# Patient Record
Sex: MALE | Race: OTHER
[De-identification: names, ages, dates, MRNs, and addresses within clinical notes are randomized per-mention and may not be internally consistent; named-entity substitution may affect disease eponyms.]

---

## 2021-06-18 ENCOUNTER — HOSPITAL ENCOUNTER (OUTPATIENT)
Age: 71
End: 2021-06-18
Payer: MEDICARE

## 2021-06-18 VITALS — BODY MASS INDEX: 25.7 KG/M2

## 2021-06-18 DIAGNOSIS — F03.90: Primary | ICD-10-CM

## 2021-06-18 LAB
CREATININE FINGERSTICK: 0.9 MG/DL (ref 0.7–1.3)
EGFR FINGERSTICK: > 60 ML/MIN (ref 60–?)

## 2021-06-18 PROCEDURE — A9575 INJ GADOTERATE MEGLUMI 0.1ML: HCPCS

## 2021-06-18 PROCEDURE — 70553 MRI BRAIN STEM W/O & W/DYE: CPT

## 2023-04-14 LAB
ANION GAP IN SER/PLAS: 12 MMOL/L (ref 10–20)
CALCIUM (MG/DL) IN SER/PLAS: 10 MG/DL (ref 8.6–10.3)
CARBON DIOXIDE, TOTAL (MMOL/L) IN SER/PLAS: 28 MMOL/L (ref 21–32)
CHLORIDE (MMOL/L) IN SER/PLAS: 105 MMOL/L (ref 98–107)
CREATININE (MG/DL) IN SER/PLAS: 1.57 MG/DL (ref 0.5–1.3)
GFR MALE: 46 ML/MIN/1.73M2
GLUCOSE (MG/DL) IN SER/PLAS: 93 MG/DL (ref 74–99)
POTASSIUM (MMOL/L) IN SER/PLAS: 4.1 MMOL/L (ref 3.5–5.3)
SODIUM (MMOL/L) IN SER/PLAS: 141 MMOL/L (ref 136–145)
UREA NITROGEN (MG/DL) IN SER/PLAS: 25 MG/DL (ref 6–23)

## 2023-04-24 ENCOUNTER — TELEPHONE (OUTPATIENT)
Dept: PRIMARY CARE | Facility: CLINIC | Age: 73
End: 2023-04-24
Payer: MEDICARE

## 2023-04-24 DIAGNOSIS — E11.9 TYPE 2 DIABETES MELLITUS WITHOUT COMPLICATION, WITHOUT LONG-TERM CURRENT USE OF INSULIN (MULTI): ICD-10-CM

## 2023-04-24 RX ORDER — PRAVASTATIN SODIUM 20 MG/1
1 TABLET ORAL DAILY
COMMUNITY
End: 2023-04-24 | Stop reason: SDUPTHER

## 2023-04-25 RX ORDER — PRAVASTATIN SODIUM 20 MG/1
20 TABLET ORAL DAILY
Qty: 90 TABLET | Refills: 3 | Status: SHIPPED | OUTPATIENT
Start: 2023-04-25 | End: 2024-06-07 | Stop reason: SDUPTHER

## 2023-05-01 LAB
ANION GAP IN SER/PLAS: 11 MMOL/L (ref 10–20)
CALCIUM (MG/DL) IN SER/PLAS: 9.6 MG/DL (ref 8.6–10.3)
CARBON DIOXIDE, TOTAL (MMOL/L) IN SER/PLAS: 28 MMOL/L (ref 21–32)
CHLORIDE (MMOL/L) IN SER/PLAS: 105 MMOL/L (ref 98–107)
CREATININE (MG/DL) IN SER/PLAS: 1.61 MG/DL (ref 0.5–1.3)
GFR MALE: 45 ML/MIN/1.73M2
GLUCOSE (MG/DL) IN SER/PLAS: 268 MG/DL (ref 74–99)
POTASSIUM (MMOL/L) IN SER/PLAS: 4.3 MMOL/L (ref 3.5–5.3)
SODIUM (MMOL/L) IN SER/PLAS: 140 MMOL/L (ref 136–145)
UREA NITROGEN (MG/DL) IN SER/PLAS: 33 MG/DL (ref 6–23)

## 2023-05-24 LAB
ALANINE AMINOTRANSFERASE (SGPT) (U/L) IN SER/PLAS: 25 U/L (ref 10–52)
ALBUMIN (G/DL) IN SER/PLAS: 4.6 G/DL (ref 3.4–5)
ALKALINE PHOSPHATASE (U/L) IN SER/PLAS: 60 U/L (ref 33–136)
ANION GAP IN SER/PLAS: 13 MMOL/L (ref 10–20)
ASPARTATE AMINOTRANSFERASE (SGOT) (U/L) IN SER/PLAS: 23 U/L (ref 9–39)
BASOPHILS (10*3/UL) IN BLOOD BY AUTOMATED COUNT: 0.03 X10E9/L (ref 0–0.1)
BASOPHILS/100 LEUKOCYTES IN BLOOD BY AUTOMATED COUNT: 0.6 % (ref 0–2)
BILIRUBIN TOTAL (MG/DL) IN SER/PLAS: 1.1 MG/DL (ref 0–1.2)
CALCIUM (MG/DL) IN SER/PLAS: 9.9 MG/DL (ref 8.6–10.3)
CARBON DIOXIDE, TOTAL (MMOL/L) IN SER/PLAS: 28 MMOL/L (ref 21–32)
CHLORIDE (MMOL/L) IN SER/PLAS: 104 MMOL/L (ref 98–107)
CHOLESTEROL (MG/DL) IN SER/PLAS: 162 MG/DL (ref 0–199)
CHOLESTEROL IN HDL (MG/DL) IN SER/PLAS: 56 MG/DL
CHOLESTEROL/HDL RATIO: 2.9
CREATININE (MG/DL) IN SER/PLAS: 1.37 MG/DL (ref 0.5–1.3)
EOSINOPHILS (10*3/UL) IN BLOOD BY AUTOMATED COUNT: 0.15 X10E9/L (ref 0–0.4)
EOSINOPHILS/100 LEUKOCYTES IN BLOOD BY AUTOMATED COUNT: 2.8 % (ref 0–6)
ERYTHROCYTE DISTRIBUTION WIDTH (RATIO) BY AUTOMATED COUNT: 12.2 % (ref 11.5–14.5)
ERYTHROCYTE MEAN CORPUSCULAR HEMOGLOBIN CONCENTRATION (G/DL) BY AUTOMATED: 32.2 G/DL (ref 32–36)
ERYTHROCYTE MEAN CORPUSCULAR VOLUME (FL) BY AUTOMATED COUNT: 100 FL (ref 80–100)
ERYTHROCYTES (10*6/UL) IN BLOOD BY AUTOMATED COUNT: 3.99 X10E12/L (ref 4.5–5.9)
ESTIMATED AVERAGE GLUCOSE FOR HBA1C: 174 MG/DL
GFR MALE: 54 ML/MIN/1.73M2
GLUCOSE (MG/DL) IN SER/PLAS: 110 MG/DL (ref 74–99)
HEMATOCRIT (%) IN BLOOD BY AUTOMATED COUNT: 39.8 % (ref 41–52)
HEMOGLOBIN (G/DL) IN BLOOD: 12.8 G/DL (ref 13.5–17.5)
HEMOGLOBIN A1C/HEMOGLOBIN TOTAL IN BLOOD: 7.7 %
IMMATURE GRANULOCYTES/100 LEUKOCYTES IN BLOOD BY AUTOMATED COUNT: 0.4 % (ref 0–0.9)
LDL: 88 MG/DL (ref 0–99)
LEUKOCYTES (10*3/UL) IN BLOOD BY AUTOMATED COUNT: 5.4 X10E9/L (ref 4.4–11.3)
LYMPHOCYTES (10*3/UL) IN BLOOD BY AUTOMATED COUNT: 1.42 X10E9/L (ref 0.8–3)
LYMPHOCYTES/100 LEUKOCYTES IN BLOOD BY AUTOMATED COUNT: 26.3 % (ref 13–44)
MONOCYTES (10*3/UL) IN BLOOD BY AUTOMATED COUNT: 0.44 X10E9/L (ref 0.05–0.8)
MONOCYTES/100 LEUKOCYTES IN BLOOD BY AUTOMATED COUNT: 8.2 % (ref 2–10)
NEUTROPHILS (10*3/UL) IN BLOOD BY AUTOMATED COUNT: 3.33 X10E9/L (ref 1.6–5.5)
NEUTROPHILS/100 LEUKOCYTES IN BLOOD BY AUTOMATED COUNT: 61.7 % (ref 40–80)
PLATELETS (10*3/UL) IN BLOOD AUTOMATED COUNT: 156 X10E9/L (ref 150–450)
POTASSIUM (MMOL/L) IN SER/PLAS: 4.3 MMOL/L (ref 3.5–5.3)
PROTEIN TOTAL: 6.9 G/DL (ref 6.4–8.2)
SODIUM (MMOL/L) IN SER/PLAS: 141 MMOL/L (ref 136–145)
TRIGLYCERIDE (MG/DL) IN SER/PLAS: 91 MG/DL (ref 0–149)
UREA NITROGEN (MG/DL) IN SER/PLAS: 23 MG/DL (ref 6–23)
VLDL: 18 MG/DL (ref 0–40)

## 2023-06-06 ENCOUNTER — OFFICE VISIT (OUTPATIENT)
Dept: PRIMARY CARE | Facility: CLINIC | Age: 73
End: 2023-06-06
Payer: MEDICARE

## 2023-06-06 VITALS
WEIGHT: 172 LBS | DIASTOLIC BLOOD PRESSURE: 82 MMHG | SYSTOLIC BLOOD PRESSURE: 140 MMHG | HEART RATE: 61 BPM | HEIGHT: 70 IN | OXYGEN SATURATION: 98 % | BODY MASS INDEX: 24.62 KG/M2

## 2023-06-06 DIAGNOSIS — I10 HYPERTENSION, UNSPECIFIED TYPE: ICD-10-CM

## 2023-06-06 DIAGNOSIS — R53.83 FATIGUE, UNSPECIFIED TYPE: ICD-10-CM

## 2023-06-06 DIAGNOSIS — Z86.010 HISTORY OF COLON POLYPS: ICD-10-CM

## 2023-06-06 DIAGNOSIS — D63.1 ANEMIA DUE TO CHRONIC KIDNEY DISEASE, UNSPECIFIED CKD STAGE: ICD-10-CM

## 2023-06-06 DIAGNOSIS — G47.33 OSA (OBSTRUCTIVE SLEEP APNEA): ICD-10-CM

## 2023-06-06 DIAGNOSIS — Z00.00 ROUTINE GENERAL MEDICAL EXAMINATION AT HEALTH CARE FACILITY: Primary | ICD-10-CM

## 2023-06-06 DIAGNOSIS — R41.3 MEMORY DIFFICULTY: ICD-10-CM

## 2023-06-06 DIAGNOSIS — N18.9 ANEMIA DUE TO CHRONIC KIDNEY DISEASE, UNSPECIFIED CKD STAGE: ICD-10-CM

## 2023-06-06 DIAGNOSIS — M25.512 CHRONIC LEFT SHOULDER PAIN: ICD-10-CM

## 2023-06-06 DIAGNOSIS — Z12.5 SCREENING FOR PROSTATE CANCER: ICD-10-CM

## 2023-06-06 DIAGNOSIS — G89.29 CHRONIC LEFT SHOULDER PAIN: ICD-10-CM

## 2023-06-06 DIAGNOSIS — E11.9 TYPE 2 DIABETES MELLITUS WITHOUT COMPLICATION, WITHOUT LONG-TERM CURRENT USE OF INSULIN (MULTI): ICD-10-CM

## 2023-06-06 DIAGNOSIS — N18.30 STAGE 3 CHRONIC KIDNEY DISEASE, UNSPECIFIED WHETHER STAGE 3A OR 3B CKD (MULTI): ICD-10-CM

## 2023-06-06 PROBLEM — Z86.0100 HISTORY OF COLON POLYPS: Status: ACTIVE | Noted: 2023-06-06

## 2023-06-06 PROCEDURE — 3051F HG A1C>EQUAL 7.0%<8.0%: CPT

## 2023-06-06 PROCEDURE — G0439 PPPS, SUBSEQ VISIT: HCPCS

## 2023-06-06 PROCEDURE — 3079F DIAST BP 80-89 MM HG: CPT

## 2023-06-06 PROCEDURE — 1036F TOBACCO NON-USER: CPT

## 2023-06-06 PROCEDURE — 3077F SYST BP >= 140 MM HG: CPT

## 2023-06-06 PROCEDURE — 1159F MED LIST DOCD IN RCRD: CPT

## 2023-06-06 PROCEDURE — 1170F FXNL STATUS ASSESSED: CPT

## 2023-06-06 PROCEDURE — 99214 OFFICE O/P EST MOD 30 MIN: CPT

## 2023-06-06 RX ORDER — LANOLIN ALCOHOL/MO/W.PET/CERES
1 CREAM (GRAM) TOPICAL DAILY
COMMUNITY
Start: 2020-07-01 | End: 2023-12-07 | Stop reason: SDUPTHER

## 2023-06-06 RX ORDER — DAPAGLIFLOZIN 10 MG/1
1 TABLET, FILM COATED ORAL
COMMUNITY
Start: 2021-10-14 | End: 2024-01-22 | Stop reason: SDUPTHER

## 2023-06-06 RX ORDER — METFORMIN HYDROCHLORIDE 1000 MG/1
1000 TABLET ORAL
Qty: 180 TABLET | Refills: 3 | Status: SHIPPED | OUTPATIENT
Start: 2023-06-06 | End: 2024-06-07 | Stop reason: SDUPTHER

## 2023-06-06 RX ORDER — METFORMIN HYDROCHLORIDE 1000 MG/1
1000 TABLET ORAL
COMMUNITY
Start: 2015-04-12 | End: 2023-06-06 | Stop reason: SDUPTHER

## 2023-06-06 RX ORDER — SITAGLIPTIN 100 MG/1
TABLET, FILM COATED ORAL
COMMUNITY
Start: 2015-04-12 | End: 2023-12-07 | Stop reason: SDUPTHER

## 2023-06-06 RX ORDER — ACETAMINOPHEN 500 MG
TABLET ORAL
COMMUNITY

## 2023-06-06 RX ORDER — MEMANTINE HYDROCHLORIDE 10 MG/1
TABLET ORAL
COMMUNITY
Start: 2023-01-29

## 2023-06-06 RX ORDER — DONEPEZIL HYDROCHLORIDE 10 MG/1
TABLET, FILM COATED ORAL
COMMUNITY
Start: 2021-08-10

## 2023-06-06 ASSESSMENT — ENCOUNTER SYMPTOMS
RESPIRATORY NEGATIVE: 1
CARDIOVASCULAR NEGATIVE: 1
CONSTITUTIONAL NEGATIVE: 1
GASTROINTESTINAL NEGATIVE: 1

## 2023-06-06 ASSESSMENT — PATIENT HEALTH QUESTIONNAIRE - PHQ9
2. FEELING DOWN, DEPRESSED OR HOPELESS: NOT AT ALL
SUM OF ALL RESPONSES TO PHQ9 QUESTIONS 1 AND 2: 0
1. LITTLE INTEREST OR PLEASURE IN DOING THINGS: NOT AT ALL

## 2023-06-06 ASSESSMENT — ACTIVITIES OF DAILY LIVING (ADL)
GROCERY_SHOPPING: INDEPENDENT
BATHING: INDEPENDENT
DRESSING: INDEPENDENT
TAKING_MEDICATION: INDEPENDENT
DOING_HOUSEWORK: INDEPENDENT
MANAGING_FINANCES: INDEPENDENT

## 2023-06-06 NOTE — PROGRESS NOTES
"Subjective   Patient ID: Pato Christiansen is a 73 y.o. male who presents for Medicare Annual Wellness Visit Subsequent (5 month follow up/Pt would also like to discuss BW.).    HPI   HYPERTENSION: BP a little high in office today 140/82. Has stopped taking losartan 25mg, endorses BP <130/80 at home. Denies CP/SOB/visual changes/dizziness.  DIABETES MELLITUS TYPE 2: Home BS's <140. Last A1C 7.7 5/24/23. Compliant with medication, no SE.  CKD: Still seeing Dr. Laird. GFR stable.  GERD: Stopped famotidine, has had no issues.  ANEMIA/FATIGUE/THROMBOCYTOPENIA: Was seeing Dr. Smith, but now Dr. Laird watches this. Hgb stable.  HISTORY OF COLON POLYPS: Colonoscopy 1/13/2020. No polyps. Was told no further colonoscopies needed.  SLEEP APNEA: Still using his CPap. Doing well with it. Nightly.   DIASTASIS RECTI: Unchanged.  URINARY FREQUENCY: Unchanged. 8 times daily, once nightly. This most likely d/t Farxiga. Would not like urology referral.  MEMORY DIFFICULTY: Saw Dr. Kate February 2022. Had MRI brain in [place] 6/18/21 which was unremarkable. Has been taking Aricept, no SE. Pretty much back to baseline.  LEFT SHOULDER PAIN: L shoulder surgery 1 year ago. Had PT, follows with Dr. Blount and will get occasional injection.  Leg cramps at night, not every night. Electrolyte drinks have helped some.  CT Cardiac score 207/moderate risk 1/25/22.     UTD flu vaccines  UTD pneumonia vaccines  Got COVID Vaccines and booster   Shingles vaccinated    Review of Systems   Constitutional: Negative.    Respiratory: Negative.     Cardiovascular: Negative.    Gastrointestinal: Negative.        Objective   /82   Pulse 61   Ht 1.778 m (5' 10\")   Wt 78 kg (172 lb)   SpO2 98%   BMI 24.68 kg/m²     Physical Exam  Constitutional:       General: He is not in acute distress.     Appearance: Normal appearance. He is not ill-appearing or toxic-appearing.   HENT:      Head: Normocephalic and atraumatic.   Eyes:      Extraocular Movements: " Extraocular movements intact.      Conjunctiva/sclera: Conjunctivae normal.   Cardiovascular:      Rate and Rhythm: Normal rate and regular rhythm.      Heart sounds: Normal heart sounds. No murmur heard.     No gallop.   Pulmonary:      Effort: Pulmonary effort is normal.      Breath sounds: Normal breath sounds. No stridor. No wheezing.   Abdominal:      General: Bowel sounds are normal. There is no distension.      Palpations: Abdomen is soft.      Tenderness: There is no abdominal tenderness. There is no right CVA tenderness, left CVA tenderness, guarding or rebound.   Musculoskeletal:         General: Normal range of motion.      Cervical back: Neck supple.   Skin:     General: Skin is warm and dry.      Findings: No erythema or rash.   Neurological:      General: No focal deficit present.      Mental Status: He is alert and oriented to person, place, and time.   Psychiatric:         Mood and Affect: Mood normal.         Behavior: Behavior normal.         Thought Content: Thought content normal.         Judgment: Judgment normal.         Assessment/Plan        Chronic conditions seem stable.  Advised he must call me if home BP averaging >130/80, he agrees.  We will follow

## 2023-06-06 NOTE — PROGRESS NOTES
Subjective   Reason for Visit: Pato Christiansen is an 73 y.o. male here for a Medicare Wellness visit.     Past Medical, Surgical, and Family History reviewed and updated in chart.    Reviewed all medications by prescribing practitioner or clinical pharmacist (such as prescriptions, OTCs, herbal therapies and supplements) and documented in the medical record.    HPI  HYPERTENSION: BP a little high in office today 140/82. Has stopped taking losartan 25mg, endorses BP <130/80 at home. Denies CP/SOB/visual changes/dizziness.  DIABETES MELLITUS TYPE 2: Home BS's <140. Last A1C 7.7 5/24/23. Compliant with medication, no SE.  CKD: Still seeing Dr. Laird. GFR stable.  GERD: Stopped famotidine, has had no issues.  ANEMIA/FATIGUE/THROMBOCYTOPENIA: Was seeing Dr. Smith, but now Dr. Laird watches this. Hgb stable.  HISTORY OF COLON POLYPS: Colonoscopy 1/13/2020. No polyps. Was told no further colonoscopies needed.  SLEEP APNEA: Still using his CPap. Doing well with it. Nightly.   DIASTASIS RECTI: Unchanged.  URINARY FREQUENCY: Unchanged. 8 times daily, once nightly. This most likely d/t Farxiga. Would not like urology referral.  MEMORY DIFFICULTY: Saw Dr. Kate February 2022. Had MRI brain in [place] 6/18/21 which was unremarkable. Has been taking Aricept, no SE. Pretty much back to baseline.  LEFT SHOULDER PAIN: L shoulder surgery 1 year ago. Had PT, follows with Dr. Blount and will get occasional injection.  Leg cramps at night, not every night. Electrolyte drinks have helped some.  CT Cardiac score 207/moderate risk 1/25/22.     UTD flu vaccines  UTD pneumonia vaccines  Got COVID Vaccines and booster   Shingles vaccinated    Patient Care Team:  Silvio Singh PA-C as PCP - General  Rustam Mackenzie MD as PCP - [employer] PCP     Review of Systems   Constitutional: Negative.    Respiratory: Negative.     Cardiovascular: Negative.    Gastrointestinal: Negative.        Objective   Vitals:  /82   Pulse 61   Ht  "1.778 m (5' 10\")   Wt 78 kg (172 lb)   SpO2 98%   BMI 24.68 kg/m²       Physical Exam  Constitutional:       General: He is not in acute distress.     Appearance: Normal appearance. He is not ill-appearing or toxic-appearing.   HENT:      Head: Normocephalic and atraumatic.   Eyes:      Extraocular Movements: Extraocular movements intact.      Conjunctiva/sclera: Conjunctivae normal.   Cardiovascular:      Rate and Rhythm: Normal rate and regular rhythm.      Heart sounds: Normal heart sounds. No murmur heard.     No gallop.   Pulmonary:      Effort: Pulmonary effort is normal.      Breath sounds: Normal breath sounds. No stridor. No wheezing.   Abdominal:      General: Bowel sounds are normal. There is no distension.      Palpations: Abdomen is soft.      Tenderness: There is no abdominal tenderness. There is no right CVA tenderness, left CVA tenderness, guarding or rebound.   Musculoskeletal:         General: Normal range of motion.      Cervical back: Neck supple.   Skin:     General: Skin is warm and dry.      Findings: No erythema or rash.   Neurological:      General: No focal deficit present.      Mental Status: He is alert and oriented to person, place, and time.   Psychiatric:         Mood and Affect: Mood normal.         Behavior: Behavior normal.         Thought Content: Thought content normal.         Judgment: Judgment normal.         Assessment/Plan   Problem List Items Addressed This Visit          Nervous    SUNNI (obstructive sleep apnea)       Circulatory    Hypertension       Digestive    History of colon polyps       Musculoskeletal    Chronic left shoulder pain       Endocrine/Metabolic    Type 2 diabetes mellitus without complication, without long-term current use of insulin (CMS/[facility])    Relevant Medications    metFORMIN (Glucophage) 1,000 mg tablet    Other Relevant Orders    Comprehensive Metabolic Panel    Hemoglobin A1C       Hematologic    Anemia due to chronic kidney disease - Primary    " Relevant Orders    Vitamin B12    Iron and TIBC    Ferritin    Folate    CBC       Other    Fatigue    Relevant Orders    Vitamin D, Total    Memory difficulty     Other Visit Diagnoses       Screening for prostate cancer        Relevant Orders    Prostate Specific Antigen, Screen    Stage 3 chronic kidney disease, unspecified whether stage 3a or 3b CKD (CMS/HCC)        Relevant Orders    Vitamin D, Total             Chronic conditions seem stable.  Advised he must call office if home BP averaging >130/80 and we will start a medication again, he agrees.  We will follow up in 6 months with labs prior.

## 2023-07-25 LAB
ALANINE AMINOTRANSFERASE (SGPT) (U/L) IN SER/PLAS: 18 U/L (ref 10–52)
ALBUMIN (G/DL) IN SER/PLAS: 4.6 G/DL (ref 3.4–5)
ALKALINE PHOSPHATASE (U/L) IN SER/PLAS: 50 U/L (ref 33–136)
ANION GAP IN SER/PLAS: 12 MMOL/L (ref 10–20)
ASPARTATE AMINOTRANSFERASE (SGOT) (U/L) IN SER/PLAS: 16 U/L (ref 9–39)
BILIRUBIN TOTAL (MG/DL) IN SER/PLAS: 1.5 MG/DL (ref 0–1.2)
CALCIUM (MG/DL) IN SER/PLAS: 9.4 MG/DL (ref 8.6–10.3)
CARBON DIOXIDE, TOTAL (MMOL/L) IN SER/PLAS: 27 MMOL/L (ref 21–32)
CHLORIDE (MMOL/L) IN SER/PLAS: 104 MMOL/L (ref 98–107)
CREATININE (MG/DL) IN SER/PLAS: 1.32 MG/DL (ref 0.5–1.3)
GFR MALE: 56 ML/MIN/1.73M2
GLUCOSE (MG/DL) IN SER/PLAS: 167 MG/DL (ref 74–99)
POTASSIUM (MMOL/L) IN SER/PLAS: 3.9 MMOL/L (ref 3.5–5.3)
PROTEIN TOTAL: 6.7 G/DL (ref 6.4–8.2)
SODIUM (MMOL/L) IN SER/PLAS: 139 MMOL/L (ref 136–145)
THYROTROPIN (MIU/L) IN SER/PLAS BY DETECTION LIMIT <= 0.05 MIU/L: 1.4 MIU/L (ref 0.44–3.98)
UREA NITROGEN (MG/DL) IN SER/PLAS: 25 MG/DL (ref 6–23)

## 2023-08-31 ENCOUNTER — TELEPHONE (OUTPATIENT)
Dept: PRIMARY CARE | Facility: CLINIC | Age: 73
End: 2023-08-31
Payer: MEDICARE

## 2023-08-31 NOTE — TELEPHONE ENCOUNTER
Wife stating she got Covid when she was in the hospital last week she was prescribed paxlovid. She is concerned the patient might get it. She bought a home test and is going to keep an eye on him. Asking if a Rx for paxlovid can be sent to the pharmacy in case he would need it, since it's a holiday weekend and is afraid she might have trouble contacting a provider. Asking for a call back.

## 2023-09-01 ENCOUNTER — TELEMEDICINE (OUTPATIENT)
Dept: PRIMARY CARE | Facility: CLINIC | Age: 73
End: 2023-09-01
Payer: MEDICARE

## 2023-09-01 DIAGNOSIS — U07.1 COVID-19 VIRUS INFECTION: Primary | ICD-10-CM

## 2023-09-01 PROCEDURE — 99441 PR PHYS/QHP TELEPHONE EVALUATION 5-10 MIN: CPT | Performed by: FAMILY MEDICINE

## 2023-09-01 NOTE — PROGRESS NOTES
Subjective   Patient ID: Pato Christiansen is a 73 y.o. male who presents for COVID+ (Pt. Covid+ pt. C/o congestion and cough x 1 day).    HPI     Telephone VV  Wife helps with  the history     Risk Factors   Over 65   DM  132 this am   Dementia   Egfr 58   Wife had covid last Monday   Pt is positive today  Congestion and cough   First day of symptoms yesterday     No fevers no ha no body aches   No sob     Has covid shots     Has never had covid before       Review of Systems    Objective   There were no vitals taken for this visit.    Physical Exam  VV  Assessment/Plan   Diagnoses and all orders for this visit:  COVID-19 virus infection  -     nirmatrelvir-ritonavir (PAXLOVID) 150-100 mg tablet therapy pack; Take 2 tablets by mouth 2 times a day for 5 days. Follow the instructions on the package    Hold aricept and pravachol

## 2023-09-08 ENCOUNTER — TELEPHONE (OUTPATIENT)
Dept: PRIMARY CARE | Facility: CLINIC | Age: 73
End: 2023-09-08

## 2023-09-08 DIAGNOSIS — U07.1 COVID: Primary | ICD-10-CM

## 2023-09-08 RX ORDER — ALBUTEROL SULFATE 90 UG/1
2 AEROSOL, METERED RESPIRATORY (INHALATION) EVERY 4 HOURS PRN
Qty: 8 G | Refills: 0 | Status: SHIPPED | OUTPATIENT
Start: 2023-09-08 | End: 2023-12-07 | Stop reason: ALTCHOICE

## 2023-09-08 NOTE — TELEPHONE ENCOUNTER
Pt has been sick with covid for 8 days now, his cough is pretty  bad, will not go away. Wants to know if job can order an inhaler for him, sent to [facility], please and thank you

## 2023-09-11 ENCOUNTER — OFFICE VISIT (OUTPATIENT)
Dept: PRIMARY CARE | Facility: CLINIC | Age: 73
End: 2023-09-11
Payer: MEDICARE

## 2023-09-11 VITALS
WEIGHT: 168.2 LBS | BODY MASS INDEX: 24.08 KG/M2 | HEIGHT: 70 IN | DIASTOLIC BLOOD PRESSURE: 64 MMHG | HEART RATE: 97 BPM | OXYGEN SATURATION: 99 % | TEMPERATURE: 97.1 F | SYSTOLIC BLOOD PRESSURE: 118 MMHG

## 2023-09-11 DIAGNOSIS — U09.9 POST-COVID-19 CONDITION: ICD-10-CM

## 2023-09-11 DIAGNOSIS — R05.1 ACUTE COUGH: Primary | ICD-10-CM

## 2023-09-11 PROBLEM — H26.493: Status: ACTIVE | Noted: 2022-03-22

## 2023-09-11 PROBLEM — Z96.1 PSEUDOPHAKIA OF BOTH EYES: Status: ACTIVE | Noted: 2017-10-11

## 2023-09-11 PROBLEM — E83.52 SERUM CALCIUM ELEVATED: Status: ACTIVE | Noted: 2023-09-11

## 2023-09-11 PROBLEM — N18.9 CHRONIC KIDNEY DISEASE: Status: ACTIVE | Noted: 2023-09-11

## 2023-09-11 PROBLEM — D64.9 ANEMIA: Status: ACTIVE | Noted: 2023-09-11

## 2023-09-11 PROBLEM — H43.392 VITREOUS FLOATERS OF LEFT EYE: Status: ACTIVE | Noted: 2017-08-18

## 2023-09-11 PROBLEM — J20.9 ACUTE BRONCHITIS: Status: ACTIVE | Noted: 2023-09-11

## 2023-09-11 PROBLEM — H52.229 REGULAR ASTIGMATISM: Status: ACTIVE | Noted: 2017-04-26

## 2023-09-11 PROBLEM — R00.1 BRADYCARDIA: Status: ACTIVE | Noted: 2023-09-11

## 2023-09-11 PROBLEM — H43.812 POSTERIOR VITREOUS DETACHMENT OF LEFT EYE: Status: ACTIVE | Noted: 2017-08-18

## 2023-09-11 PROBLEM — R05.9 COUGH: Status: ACTIVE | Noted: 2023-09-11

## 2023-09-11 PROBLEM — M24.812 INTERNAL DERANGEMENT OF LEFT SHOULDER: Status: ACTIVE | Noted: 2023-09-11

## 2023-09-11 PROBLEM — Z96.1 S/P CATARACT EXTRACTION AND INSERTION OF INTRAOCULAR LENS: Status: ACTIVE | Noted: 2017-05-19

## 2023-09-11 PROBLEM — R35.0 URINARY FREQUENCY: Status: ACTIVE | Noted: 2023-09-11

## 2023-09-11 PROBLEM — R89.9 ABNORMAL LABORATORY TEST: Status: ACTIVE | Noted: 2023-09-11

## 2023-09-11 PROBLEM — Z98.890 HISTORY OF LASER ASSISTED IN SITU KERATOMILEUSIS: Status: ACTIVE | Noted: 2017-04-26

## 2023-09-11 PROBLEM — M62.838 MUSCLE SPASM: Status: ACTIVE | Noted: 2023-09-11

## 2023-09-11 PROBLEM — K21.9 GERD (GASTROESOPHAGEAL REFLUX DISEASE): Status: ACTIVE | Noted: 2023-09-11

## 2023-09-11 PROBLEM — H35.54 RPE (RETINAL PIGMENT EPITHELIUM) ATROPHY: Status: ACTIVE | Noted: 2021-09-22

## 2023-09-11 PROBLEM — Z98.49 S/P CATARACT EXTRACTION AND INSERTION OF INTRAOCULAR LENS: Status: ACTIVE | Noted: 2017-05-19

## 2023-09-11 PROBLEM — M54.2 NECK PAIN: Status: ACTIVE | Noted: 2023-09-11

## 2023-09-11 PROBLEM — H52.13 HIGH MYOPIA, BILATERAL: Status: ACTIVE | Noted: 2021-09-22

## 2023-09-11 PROCEDURE — 3078F DIAST BP <80 MM HG: CPT | Performed by: NURSE PRACTITIONER

## 2023-09-11 PROCEDURE — 1159F MED LIST DOCD IN RCRD: CPT | Performed by: NURSE PRACTITIONER

## 2023-09-11 PROCEDURE — 1036F TOBACCO NON-USER: CPT | Performed by: NURSE PRACTITIONER

## 2023-09-11 PROCEDURE — 3074F SYST BP LT 130 MM HG: CPT | Performed by: NURSE PRACTITIONER

## 2023-09-11 PROCEDURE — 99213 OFFICE O/P EST LOW 20 MIN: CPT | Performed by: NURSE PRACTITIONER

## 2023-09-11 PROCEDURE — 3051F HG A1C>EQUAL 7.0%<8.0%: CPT | Performed by: NURSE PRACTITIONER

## 2023-09-11 RX ORDER — BENZONATATE 200 MG/1
200 CAPSULE ORAL 3 TIMES DAILY PRN
Qty: 42 CAPSULE | Refills: 0 | Status: SHIPPED | OUTPATIENT
Start: 2023-09-11 | End: 2023-10-11

## 2023-09-11 RX ORDER — LORATADINE 10 MG/1
10 TABLET ORAL DAILY
COMMUNITY
End: 2024-04-15 | Stop reason: ALTCHOICE

## 2023-09-11 ASSESSMENT — ENCOUNTER SYMPTOMS
HEADACHES: 0
APPETITE CHANGE: 0
MYALGIAS: 0
NAUSEA: 0
DIARRHEA: 0
WHEEZING: 0
ARTHRALGIAS: 0
DIZZINESS: 0
COUGH: 1
FEVER: 0
SHORTNESS OF BREATH: 0
VOMITING: 0
ACTIVITY CHANGE: 0
LIGHT-HEADEDNESS: 0
PALPITATIONS: 0

## 2023-09-11 NOTE — PROGRESS NOTES
"Subjective   Patient ID: Pato Christiansen is a 73 y.o. male who presents for Cough (Worse the last 3 days).    Had COVID, completed paxlovid around 09/06/23; symptoms improved except cough  Continues to have a cough barky cough throughout the day   Tried OTC cold medication, low dose Tessalon Perles, albuterol inhaler.  \"Nothing seems to help\"  Denies SOC, CP, HA, wheezing         Review of Systems   Constitutional:  Negative for activity change, appetite change and fever.   Respiratory:  Positive for cough. Negative for shortness of breath and wheezing.    Cardiovascular:  Negative for chest pain, palpitations and leg swelling.   Gastrointestinal:  Negative for diarrhea, nausea and vomiting.   Musculoskeletal:  Negative for arthralgias and myalgias.   Neurological:  Negative for dizziness, light-headedness and headaches.       Objective   /64 (Patient Position: Sitting)   Pulse 97   Temp 36.2 °C (97.1 °F)   Ht 1.778 m (5' 10\")   Wt 76.3 kg (168 lb 3.2 oz)   SpO2 99%   BMI 24.13 kg/m²     Physical Exam  Vitals reviewed.   Constitutional:       General: He is not in acute distress.     Appearance: Normal appearance. He is normal weight. He is not ill-appearing.   Cardiovascular:      Rate and Rhythm: Normal rate and regular rhythm.      Pulses: Normal pulses.   Pulmonary:      Effort: Pulmonary effort is normal.      Breath sounds: Normal breath sounds. No wheezing.   Skin:     General: Skin is warm and dry.   Neurological:      Mental Status: He is alert and oriented to person, place, and time.         Assessment/Plan   Diagnoses and all orders for this visit:  Acute cough  -     benzonatate (Tessalon) 200 mg capsule; Take 1 capsule (200 mg) by mouth 3 times a day as needed for cough. Do not crush or chew.  - Recommend switching from Claritin to allegra, may take OTC Mucinex as needed for cough/congestion  - Continue to use albuterol inhaler as needed  Post-COVID-19 condition  - frequent cough; treatment as " above

## 2023-09-18 PROBLEM — E11.21: Status: ACTIVE | Noted: 2023-09-18

## 2023-09-18 RX ORDER — TIZANIDINE 2 MG/1
1 TABLET ORAL NIGHTLY
COMMUNITY
End: 2023-12-07 | Stop reason: ALTCHOICE

## 2023-09-18 RX ORDER — BLOOD SUGAR DIAGNOSTIC
STRIP MISCELLANEOUS DAILY
COMMUNITY
End: 2024-04-17 | Stop reason: WASHOUT

## 2023-09-18 RX ORDER — TROLAMINE SALICYLATE 10 G/100G
CREAM TOPICAL
COMMUNITY
End: 2023-12-07 | Stop reason: WASHOUT

## 2023-10-10 DIAGNOSIS — N18.9 CHRONIC KIDNEY DISEASE, UNSPECIFIED CKD STAGE: Primary | ICD-10-CM

## 2023-10-11 ENCOUNTER — LAB (OUTPATIENT)
Dept: LAB | Facility: LAB | Age: 73
End: 2023-10-11
Payer: MEDICARE

## 2023-10-11 DIAGNOSIS — N18.9 CHRONIC KIDNEY DISEASE, UNSPECIFIED CKD STAGE: ICD-10-CM

## 2023-10-11 LAB
ANION GAP SERPL CALC-SCNC: 12 MMOL/L (ref 10–20)
BUN SERPL-MCNC: 19 MG/DL (ref 6–23)
CALCIUM SERPL-MCNC: 9.7 MG/DL (ref 8.6–10.3)
CHLORIDE SERPL-SCNC: 106 MMOL/L (ref 98–107)
CO2 SERPL-SCNC: 29 MMOL/L (ref 21–32)
CREAT SERPL-MCNC: 1.31 MG/DL (ref 0.5–1.3)
GFR SERPL CREATININE-BSD FRML MDRD: 57 ML/MIN/1.73M*2
GLUCOSE SERPL-MCNC: 180 MG/DL (ref 74–99)
POTASSIUM SERPL-SCNC: 4.7 MMOL/L (ref 3.5–5.3)
SODIUM SERPL-SCNC: 142 MMOL/L (ref 136–145)

## 2023-10-11 PROCEDURE — 36415 COLL VENOUS BLD VENIPUNCTURE: CPT

## 2023-10-11 PROCEDURE — 80048 BASIC METABOLIC PNL TOTAL CA: CPT

## 2023-10-18 ENCOUNTER — TELEPHONE (OUTPATIENT)
Dept: NEPHROLOGY | Facility: CLINIC | Age: 73
End: 2023-10-18

## 2023-10-18 ENCOUNTER — DOCUMENTATION (OUTPATIENT)
Dept: NEPHROLOGY | Facility: CLINIC | Age: 73
End: 2023-10-18

## 2023-10-18 ENCOUNTER — OFFICE VISIT (OUTPATIENT)
Dept: NEPHROLOGY | Facility: CLINIC | Age: 73
End: 2023-10-18
Payer: MEDICARE

## 2023-10-18 VITALS
BODY MASS INDEX: 25.11 KG/M2 | HEART RATE: 77 BPM | SYSTOLIC BLOOD PRESSURE: 128 MMHG | DIASTOLIC BLOOD PRESSURE: 76 MMHG | WEIGHT: 175 LBS

## 2023-10-18 DIAGNOSIS — N18.31 STAGE 3A CHRONIC KIDNEY DISEASE (MULTI): Primary | ICD-10-CM

## 2023-10-18 DIAGNOSIS — I10 PRIMARY HYPERTENSION: ICD-10-CM

## 2023-10-18 DIAGNOSIS — E11.21 CONTROLLED DIABETES MELLITUS WITH NEPHROPATHY (MULTI): ICD-10-CM

## 2023-10-18 PROCEDURE — 1036F TOBACCO NON-USER: CPT | Performed by: INTERNAL MEDICINE

## 2023-10-18 PROCEDURE — 1159F MED LIST DOCD IN RCRD: CPT | Performed by: INTERNAL MEDICINE

## 2023-10-18 PROCEDURE — 3078F DIAST BP <80 MM HG: CPT | Performed by: INTERNAL MEDICINE

## 2023-10-18 PROCEDURE — 3066F NEPHROPATHY DOC TX: CPT | Performed by: INTERNAL MEDICINE

## 2023-10-18 PROCEDURE — 1160F RVW MEDS BY RX/DR IN RCRD: CPT | Performed by: INTERNAL MEDICINE

## 2023-10-18 PROCEDURE — 3074F SYST BP LT 130 MM HG: CPT | Performed by: INTERNAL MEDICINE

## 2023-10-18 PROCEDURE — 99213 OFFICE O/P EST LOW 20 MIN: CPT | Performed by: INTERNAL MEDICINE

## 2023-10-18 PROCEDURE — 3051F HG A1C>EQUAL 7.0%<8.0%: CPT | Performed by: INTERNAL MEDICINE

## 2023-10-18 RX ORDER — LOSARTAN POTASSIUM 25 MG/1
25 TABLET ORAL DAILY
COMMUNITY
End: 2024-05-13 | Stop reason: SDUPTHER

## 2023-10-18 NOTE — PROGRESS NOTES
Subjective     Has had a rough fall  Had COVID andhas been very weak.    Patient ID: Pato Christiansen is a 73 y.o. male who presents for 6 month follow up.  HPI  He is here for follow-up secondary to chronic kidney disease stage III with a baseline creatinine of 1.2-1.5 with underlying diabetes and hypertension also has obstructive sleep apnea and a left renal cyst  Labs were recently completed on October 11  BUN is 19 with a creatinine of 1.31 estimated GFR is 57 electrolytes look very good glucose elevated at 180  Her renal function is quite stable  His medications are reviewed and they include aspirin vitamin D Farxiga Januvia metformin statin  His blood pressure today is 128/76  His last hemoglobin A1c was 7.7  Review of Systems   All other systems reviewed and are negative.      Objective   Physical Exam  Constitutional:       Appearance: Normal appearance.   HENT:      Head: Normocephalic and atraumatic.      Right Ear: External ear normal.      Left Ear: External ear normal.      Nose: Nose normal.      Mouth/Throat:      Mouth: Mucous membranes are moist.      Pharynx: Oropharynx is clear.   Eyes:      Extraocular Movements: Extraocular movements intact.      Conjunctiva/sclera: Conjunctivae normal.      Pupils: Pupils are equal, round, and reactive to light.   Cardiovascular:      Rate and Rhythm: Normal rate and regular rhythm.   Pulmonary:      Effort: Pulmonary effort is normal.      Breath sounds: Normal breath sounds.   Abdominal:      General: Abdomen is flat.      Palpations: Abdomen is soft.   Skin:     General: Skin is warm and dry.   Neurological:      General: No focal deficit present.      Mental Status: He is alert and oriented to person, place, and time.      Comments: Left Tremor   Psychiatric:         Mood and Affect: Mood normal.         Behavior: Behavior normal.         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

## 2023-11-30 ENCOUNTER — HOSPITAL ENCOUNTER (OUTPATIENT)
Age: 73
Discharge: HOME | End: 2023-11-30
Payer: MEDICARE

## 2023-11-30 DIAGNOSIS — F03.90: Primary | ICD-10-CM

## 2023-11-30 DIAGNOSIS — G25.0: ICD-10-CM

## 2023-11-30 LAB
ALANINE AMINOTRANSFER ALT/SGPT: 24 U/L (ref 16–61)
ALBUMIN SERPL-MCNC: 4.1 G/DL (ref 3.2–5)
ALKALINE PHOSPHATASE: 59 U/L (ref 45–117)
ANION GAP: 7 (ref 5–15)
AST(SGOT): 22 U/L (ref 15–37)
BUN SERPL-MCNC: 20 MG/DL (ref 7–18)
BUN/CREAT RATIO: 14.1 RATIO (ref 10–20)
CALCIUM SERPL-MCNC: 9.4 MG/DL (ref 8.5–10.1)
CARBON DIOXIDE: 28 MMOL/L (ref 21–32)
CHLORIDE: 107 MMOL/L (ref 98–107)
EST GLOM FILT RATE - AFR AMER: 63 ML/MIN (ref 60–?)
GLOBULIN: 3 G/DL (ref 2.2–4.2)
GLUCOSE: 107 MG/DL (ref 74–106)
POTASSIUM: 4.5 MMOL/L (ref 3.5–5.1)

## 2023-11-30 PROCEDURE — 36415 COLL VENOUS BLD VENIPUNCTURE: CPT

## 2023-11-30 PROCEDURE — 84443 ASSAY THYROID STIM HORMONE: CPT

## 2023-11-30 PROCEDURE — 80053 COMPREHEN METABOLIC PANEL: CPT

## 2023-12-01 ENCOUNTER — LAB (OUTPATIENT)
Dept: LAB | Facility: LAB | Age: 73
End: 2023-12-01
Payer: MEDICARE

## 2023-12-01 DIAGNOSIS — R53.83 FATIGUE, UNSPECIFIED TYPE: ICD-10-CM

## 2023-12-01 DIAGNOSIS — N18.30 STAGE 3 CHRONIC KIDNEY DISEASE, UNSPECIFIED WHETHER STAGE 3A OR 3B CKD (MULTI): ICD-10-CM

## 2023-12-01 DIAGNOSIS — E11.9 TYPE 2 DIABETES MELLITUS WITHOUT COMPLICATION, WITHOUT LONG-TERM CURRENT USE OF INSULIN (MULTI): ICD-10-CM

## 2023-12-01 DIAGNOSIS — N18.9 ANEMIA DUE TO CHRONIC KIDNEY DISEASE, UNSPECIFIED CKD STAGE: ICD-10-CM

## 2023-12-01 DIAGNOSIS — Z12.5 SCREENING FOR PROSTATE CANCER: ICD-10-CM

## 2023-12-01 DIAGNOSIS — D63.1 ANEMIA DUE TO CHRONIC KIDNEY DISEASE, UNSPECIFIED CKD STAGE: ICD-10-CM

## 2023-12-01 LAB
25(OH)D3 SERPL-MCNC: 102 NG/ML (ref 30–100)
ALBUMIN SERPL BCP-MCNC: 4.5 G/DL (ref 3.4–5)
ALP SERPL-CCNC: 53 U/L (ref 33–136)
ALT SERPL W P-5'-P-CCNC: 16 U/L (ref 10–52)
ANION GAP SERPL CALC-SCNC: 12 MMOL/L (ref 10–20)
AST SERPL W P-5'-P-CCNC: 22 U/L (ref 9–39)
BILIRUB SERPL-MCNC: 1.3 MG/DL (ref 0–1.2)
BUN SERPL-MCNC: 21 MG/DL (ref 6–23)
CALCIUM SERPL-MCNC: 9.8 MG/DL (ref 8.6–10.3)
CHLORIDE SERPL-SCNC: 105 MMOL/L (ref 98–107)
CO2 SERPL-SCNC: 29 MMOL/L (ref 21–32)
CREAT SERPL-MCNC: 1.34 MG/DL (ref 0.5–1.3)
ERYTHROCYTE [DISTWIDTH] IN BLOOD BY AUTOMATED COUNT: 11.9 % (ref 11.5–14.5)
EST. AVERAGE GLUCOSE BLD GHB EST-MCNC: 157 MG/DL
FERRITIN SERPL-MCNC: 77 NG/ML (ref 20–300)
FOLATE SERPL-MCNC: >22.3 NG/ML
GFR SERPL CREATININE-BSD FRML MDRD: 56 ML/MIN/1.73M*2
GLUCOSE SERPL-MCNC: 173 MG/DL (ref 74–99)
HBA1C MFR BLD: 7.1 %
HCT VFR BLD AUTO: 38.7 % (ref 41–52)
HGB BLD-MCNC: 12.9 G/DL (ref 13.5–17.5)
IRON SATN MFR SERPL: 42 % (ref 25–45)
IRON SERPL-MCNC: 122 UG/DL (ref 35–150)
MCH RBC QN AUTO: 33.6 PG (ref 26–34)
MCHC RBC AUTO-ENTMCNC: 33.3 G/DL (ref 32–36)
MCV RBC AUTO: 101 FL (ref 80–100)
NRBC BLD-RTO: 0 /100 WBCS (ref 0–0)
PLATELET # BLD AUTO: 144 X10*3/UL (ref 150–450)
POTASSIUM SERPL-SCNC: 4.3 MMOL/L (ref 3.5–5.3)
PROT SERPL-MCNC: 6.5 G/DL (ref 6.4–8.2)
PSA SERPL-MCNC: 0.67 NG/ML
RBC # BLD AUTO: 3.84 X10*6/UL (ref 4.5–5.9)
SODIUM SERPL-SCNC: 142 MMOL/L (ref 136–145)
TIBC SERPL-MCNC: 291 UG/DL (ref 240–445)
UIBC SERPL-MCNC: 169 UG/DL (ref 110–370)
VIT B12 SERPL-MCNC: 1472 PG/ML (ref 211–911)
WBC # BLD AUTO: 5.4 X10*3/UL (ref 4.4–11.3)

## 2023-12-01 PROCEDURE — 83550 IRON BINDING TEST: CPT

## 2023-12-01 PROCEDURE — 82728 ASSAY OF FERRITIN: CPT

## 2023-12-01 PROCEDURE — 82746 ASSAY OF FOLIC ACID SERUM: CPT

## 2023-12-01 PROCEDURE — 82607 VITAMIN B-12: CPT

## 2023-12-01 PROCEDURE — 83540 ASSAY OF IRON: CPT

## 2023-12-01 PROCEDURE — 85027 COMPLETE CBC AUTOMATED: CPT

## 2023-12-01 PROCEDURE — 36415 COLL VENOUS BLD VENIPUNCTURE: CPT

## 2023-12-01 PROCEDURE — 82306 VITAMIN D 25 HYDROXY: CPT

## 2023-12-01 PROCEDURE — G0103 PSA SCREENING: HCPCS

## 2023-12-01 PROCEDURE — 80053 COMPREHEN METABOLIC PANEL: CPT

## 2023-12-01 PROCEDURE — 83036 HEMOGLOBIN GLYCOSYLATED A1C: CPT

## 2023-12-07 ENCOUNTER — OFFICE VISIT (OUTPATIENT)
Dept: PRIMARY CARE | Facility: CLINIC | Age: 73
End: 2023-12-07
Payer: MEDICARE

## 2023-12-07 VITALS
BODY MASS INDEX: 24.62 KG/M2 | HEIGHT: 70 IN | SYSTOLIC BLOOD PRESSURE: 108 MMHG | HEART RATE: 72 BPM | DIASTOLIC BLOOD PRESSURE: 68 MMHG | WEIGHT: 172 LBS

## 2023-12-07 DIAGNOSIS — G47.33 OSA (OBSTRUCTIVE SLEEP APNEA): ICD-10-CM

## 2023-12-07 DIAGNOSIS — K21.9 GASTROESOPHAGEAL REFLUX DISEASE WITHOUT ESOPHAGITIS: ICD-10-CM

## 2023-12-07 DIAGNOSIS — N18.31 ANEMIA DUE TO STAGE 3A CHRONIC KIDNEY DISEASE (MULTI): ICD-10-CM

## 2023-12-07 DIAGNOSIS — D63.1 ANEMIA DUE TO STAGE 3A CHRONIC KIDNEY DISEASE (MULTI): ICD-10-CM

## 2023-12-07 DIAGNOSIS — I10 PRIMARY HYPERTENSION: ICD-10-CM

## 2023-12-07 DIAGNOSIS — R53.83 FATIGUE, UNSPECIFIED TYPE: ICD-10-CM

## 2023-12-07 DIAGNOSIS — R41.3 MEMORY DIFFICULTY: Primary | ICD-10-CM

## 2023-12-07 DIAGNOSIS — E11.9 TYPE 2 DIABETES MELLITUS WITHOUT COMPLICATION, WITHOUT LONG-TERM CURRENT USE OF INSULIN (MULTI): ICD-10-CM

## 2023-12-07 PROCEDURE — 3051F HG A1C>EQUAL 7.0%<8.0%: CPT

## 2023-12-07 PROCEDURE — 1160F RVW MEDS BY RX/DR IN RCRD: CPT

## 2023-12-07 PROCEDURE — 1159F MED LIST DOCD IN RCRD: CPT

## 2023-12-07 PROCEDURE — 3078F DIAST BP <80 MM HG: CPT

## 2023-12-07 PROCEDURE — 3066F NEPHROPATHY DOC TX: CPT

## 2023-12-07 PROCEDURE — 99214 OFFICE O/P EST MOD 30 MIN: CPT

## 2023-12-07 PROCEDURE — 3074F SYST BP LT 130 MM HG: CPT

## 2023-12-07 PROCEDURE — 1036F TOBACCO NON-USER: CPT

## 2023-12-07 PROCEDURE — 4010F ACE/ARB THERAPY RXD/TAKEN: CPT

## 2023-12-07 RX ORDER — LANOLIN ALCOHOL/MO/W.PET/CERES
1000 CREAM (GRAM) TOPICAL DAILY
Qty: 90 TABLET | Refills: 3 | Status: SHIPPED | OUTPATIENT
Start: 2023-12-07 | End: 2024-12-06

## 2023-12-07 RX ORDER — SITAGLIPTIN 100 MG/1
TABLET, FILM COATED ORAL
Qty: 90 TABLET | Refills: 3 | Status: SHIPPED | OUTPATIENT
Start: 2023-12-07

## 2023-12-07 RX ORDER — PRIMIDONE 50 MG/1
50 TABLET ORAL 2 TIMES DAILY
COMMUNITY
Start: 2023-11-30

## 2023-12-07 ASSESSMENT — ENCOUNTER SYMPTOMS
CARDIOVASCULAR NEGATIVE: 1
GASTROINTESTINAL NEGATIVE: 1
CONSTITUTIONAL NEGATIVE: 1
RESPIRATORY NEGATIVE: 1

## 2023-12-07 NOTE — PROGRESS NOTES
"Subjective   Patient ID: Pato Christiansen is a 73 y.o. male who presents for 6 month med check .    HPI   HYPERTENSION: BP a little high in office today 108/68. Compliant with regimen, endorses BP <130/80 at home. Denies CP/SOB/visual changes/dizziness.  DIABETES MELLITUS TYPE 2: Home BS's 100-140. Last A1C 7.1 12/1/23. Compliant with medication, no SE.  CKD: Still seeing Dr. Laird. GFR stable.  GERD: Stopped famotidine, has had no issues.  ANEMIA/FATIGUE/THROMBOCYTOPENIA: Was seeing Dr. Smith, but now Dr. Laird watches this. Hgb stable.  HISTORY OF COLON POLYPS: Colonoscopy 1/13/2020. No polyps. Was told no further colonoscopies needed.  SLEEP APNEA: Still using his CPap. Doing well with it. More prn now.  DIASTASIS RECTI: Unchanged.  URINARY FREQUENCY: Unchanged. 8 times daily, once nightly. This most likely d/t Farxiga. Would not like urology referral.  MEMORY DIFFICULTY: Sees Dr. Kate February annually. Had MRI brain in [place] 6/18/21 which was unremarkable. Has been taking Aricept, no SE. Pretty much back to baseline.  LEFT SHOULDER PAIN: L shoulder surgery 2022. Had PT, follows with Dr. Blount and will get occasional injection.  CRAMPS: Leg cramps at night, not every night. Electrolyte drinks have helped some.  LOW B12: Last level 1400.   LOW VIT D: Last level 102.   CT Cardiac score 207/moderate risk 1/25/22.     UTD flu vaccines  UTD pneumonia vaccines  Got COVID Vaccines and booster   Shingles vaccinated    Review of Systems   Constitutional: Negative.    Respiratory: Negative.     Cardiovascular: Negative.    Gastrointestinal: Negative.        Objective   /68   Pulse 72   Ht 1.778 m (5' 10\")   Wt 78 kg (172 lb)   BMI 24.68 kg/m²     Physical Exam  Constitutional:       General: He is not in acute distress.     Appearance: Normal appearance. He is not ill-appearing.   HENT:      Head: Normocephalic and atraumatic.   Eyes:      Extraocular Movements: Extraocular movements intact.      " Conjunctiva/sclera: Conjunctivae normal.   Cardiovascular:      Rate and Rhythm: Normal rate.   Pulmonary:      Effort: Pulmonary effort is normal.   Abdominal:      General: There is no distension.   Musculoskeletal:         General: Normal range of motion.      Cervical back: Normal range of motion.   Skin:     General: Skin is warm and dry.   Neurological:      General: No focal deficit present.      Mental Status: He is alert and oriented to person, place, and time.   Psychiatric:         Mood and Affect: Mood normal.         Behavior: Behavior normal.         Thought Content: Thought content normal.         Judgment: Judgment normal.         Assessment/Plan        Chronic conditions stable.  Seeing specialists appropriately.  Advised he skip B12 every other day for about 2 months. No other medication changes today.  Labs stable.  Follow up 6 months with fasting labs prior.

## 2024-01-22 DIAGNOSIS — E11.69 TYPE 2 DIABETES MELLITUS WITH OTHER SPECIFIED COMPLICATION, UNSPECIFIED WHETHER LONG TERM INSULIN USE (MULTI): ICD-10-CM

## 2024-01-22 RX ORDER — DAPAGLIFLOZIN 10 MG/1
10 TABLET, FILM COATED ORAL
Qty: 90 TABLET | Refills: 3 | Status: SHIPPED | OUTPATIENT
Start: 2024-01-22 | End: 2025-01-21

## 2024-02-26 ENCOUNTER — TELEPHONE (OUTPATIENT)
Dept: NEPHROLOGY | Facility: CLINIC | Age: 74
End: 2024-02-26

## 2024-02-26 ENCOUNTER — OFFICE VISIT (OUTPATIENT)
Dept: ORTHOPEDIC SURGERY | Facility: CLINIC | Age: 74
End: 2024-02-26
Payer: MEDICARE

## 2024-02-26 ENCOUNTER — HOSPITAL ENCOUNTER (OUTPATIENT)
Dept: RADIOLOGY | Facility: CLINIC | Age: 74
Discharge: HOME | End: 2024-02-26
Payer: MEDICARE

## 2024-02-26 DIAGNOSIS — R26.89 BALANCE DISORDER: Primary | ICD-10-CM

## 2024-02-26 DIAGNOSIS — R29.898 LEG WEAKNESS, BILATERAL: ICD-10-CM

## 2024-02-26 DIAGNOSIS — M25.512 PAIN IN LEFT SHOULDER: ICD-10-CM

## 2024-02-26 PROCEDURE — 72040 X-RAY EXAM NECK SPINE 2-3 VW: CPT

## 2024-02-26 PROCEDURE — 99214 OFFICE O/P EST MOD 30 MIN: CPT | Performed by: NURSE PRACTITIONER

## 2024-02-26 PROCEDURE — 4010F ACE/ARB THERAPY RXD/TAKEN: CPT | Performed by: NURSE PRACTITIONER

## 2024-02-26 PROCEDURE — 1159F MED LIST DOCD IN RCRD: CPT | Performed by: NURSE PRACTITIONER

## 2024-02-26 PROCEDURE — 1036F TOBACCO NON-USER: CPT | Performed by: NURSE PRACTITIONER

## 2024-02-26 PROCEDURE — 1160F RVW MEDS BY RX/DR IN RCRD: CPT | Performed by: NURSE PRACTITIONER

## 2024-02-26 PROCEDURE — 72040 X-RAY EXAM NECK SPINE 2-3 VW: CPT | Performed by: RADIOLOGY

## 2024-02-26 PROCEDURE — 1125F AMNT PAIN NOTED PAIN PRSNT: CPT | Performed by: NURSE PRACTITIONER

## 2024-02-26 ASSESSMENT — ENCOUNTER SYMPTOMS
RESPIRATORY NEGATIVE: 1
CONSTITUTIONAL NEGATIVE: 1
PSYCHIATRIC NEGATIVE: 1
CARDIOVASCULAR NEGATIVE: 1
ENDOCRINE NEGATIVE: 1
HEMATOLOGIC/LYMPHATIC NEGATIVE: 1
ARTHRALGIAS: 1

## 2024-02-26 ASSESSMENT — PAIN - FUNCTIONAL ASSESSMENT: PAIN_FUNCTIONAL_ASSESSMENT: 0-10

## 2024-02-26 ASSESSMENT — PAIN SCALES - GENERAL: PAINLEVEL_OUTOF10: 4

## 2024-02-26 ASSESSMENT — PAIN DESCRIPTION - DESCRIPTORS: DESCRIPTORS: ACHING;CRAMPING;DULL

## 2024-02-26 NOTE — PROGRESS NOTES
Subjective    Patient ID: Pato Christiansen is a 74 y.o. male.    Chief Complaint: Weakness, Gen of the Left Leg (Pt is accompanied by his wife. They states that he had Covid in 09/2023, His wife also mentioned that she realized the patient was on a medication that can cause weakness upon standing. She had the patient stop the medication and he states he feels better. Patient has fallen twice  in the last two weeks, when he stood up from sitting in a chair.) and Weakness, Gen of the Right Leg     Last Surgery: No surgery found  Last Surgery Date: No surgery found    HPI  Is a pleasant 74-year-old gentleman presenting today for  NPV bilat leg weakness, COVID in Septemebr 2023, weakness since  Had generalized leg pain early after Covid, now resolved  2 falls in 2 weeks, no injuries  Farxiga- wife concerned for adverse reaction, stopped med 2 days ago and overall less weakness and shakiness  Weakness was to upper legs  No pain or weakness to low back  + feels off balance with walking    Review of Systems   Constitutional: Negative.    HENT: Negative.     Respiratory: Negative.     Cardiovascular: Negative.    Endocrine: Negative.    Musculoskeletal:  Positive for arthralgias and gait problem.   Skin: Negative.    Neurological:  Positive for weakness.        Low extremity weakness, balance disturbance   Hematological: Negative.    Psychiatric/Behavioral: Negative.          Objective   Right Knee Exam     Comments:  Baseline range of motion      Left Knee Exam     Comments:  Baseline range of motion      Right Hip Exam     Tests   MARIANNE: negative    Other   Erythema: absent  Sensation: normal  Pulse: present    Comments:  Baseline range of motion, positive quad weakness      Left Hip Exam     Tests   MARIANNE: negative    Other   Erythema: absent  Sensation: normal  Pulse: present    Comments:  Baseline range of motion, positive quad weakness      Back Exam     Tenderness   Back tenderness location: none.    Range of Motion    The patient has normal back ROM.    Other   Sensation: normal  Erythema: no back redness    Comments:  Patient ambulates with purposeful gait, hold onto walls and monitor.  Mild to moderate bilateral lower leg weakness, supposed to do with quad strength noted.  Difficulty with single-leg stand and squat.  Distal neurovascular intact.            Image Results:      Assessment/Plan   Encounter Diagnoses:  Problem List Items Addressed This Visit             ICD-10-CM    Leg weakness, bilateral R29.898     We discussed position change in home safety.  I did recommend cane use for support on as needed basis.  We discussed no orthopedic identified during today's visit.  Patient does demonstrate lower extremity weakness and will initiate PT for strengthening and balance concerns.  Patient and spouse referred to follow-up with Dr. Laird regarding concern for adverse effects of Farxiga, will also provide a copy of today's visit.  Recommended follow-up with PCP regarding concerns for long-haul COVID symptoms exacerbating balance disturbance and lower extremity strength possible neurology referral.,   Plan for follow-up.  On as needed basis for any bone or joint concerns.  Patient and family in agreement with plan of care  This note was generated using Dragon software.  It may contain errors in wording, punctuation or spelling.         Relevant Orders    Follow Up In Orthopaedic Surgery    Referral to Physical Therapy    Balance disorder - Primary R26.89    Relevant Orders    Follow Up In Orthopaedic Surgery    Referral to Physical Therapy

## 2024-02-26 NOTE — LETTER
March 1, 2024     Silvio Singh PA-C  [address]  [facility] [place], [address]  [place] [id number]    Patient: Pato Christiansen   YOB: 1950   Date of Visit: 2/26/2024       Dear Dr. Silvio Singh PA-C:    Thank you for referring Pato Christiansen to me for evaluation. Below are my notes for this consultation.  If you have questions, please do not hesitate to call me. I look forward to following your patient along with you.       Sincerely,     Chayo Malik, APRN-CNP      CC: No Recipients  ______________________________________________________________________________________    Subjective    Patient ID: Pato Christiansen is a 74 y.o. male.    Chief Complaint: Weakness, Gen of the Left Leg (Pt is accompanied by his wife. They states that he had Covid in 09/2023, His wife also mentioned that she realized the patient was on a medication that can cause weakness upon standing. She had the patient stop the medication and he states he feels better. Patient has fallen twice  in the last two weeks, when he stood up from sitting in a chair.) and Weakness, Gen of the Right Leg     Last Surgery: No surgery found  Last Surgery Date: No surgery found    HPI  Is a pleasant 74-year-old gentleman presenting today for  NPV bilat leg weakness, COVID in Septemebr 2023, weakness since  Had generalized leg pain early after Covid, now resolved  2 falls in 2 weeks, no injuries  Farxiga- wife concerned for adverse reaction, stopped med 2 days ago and overall less weakness and shakiness  Weakness was to upper legs  No pain or weakness to low back  + feels off balance with walking    Review of Systems   Constitutional: Negative.    HENT: Negative.     Respiratory: Negative.     Cardiovascular: Negative.    Endocrine: Negative.    Musculoskeletal:  Positive for arthralgias and gait problem.   Skin: Negative.    Neurological:  Positive for weakness.        Low extremity weakness, balance disturbance   Hematological: Negative.     Psychiatric/Behavioral: Negative.          Objective   Right Knee Exam     Comments:  Baseline range of motion      Left Knee Exam     Comments:  Baseline range of motion      Right Hip Exam     Tests   MARIANNE: negative    Other   Erythema: absent  Sensation: normal  Pulse: present    Comments:  Baseline range of motion, positive quad weakness      Left Hip Exam     Tests   MARIANNE: negative    Other   Erythema: absent  Sensation: normal  Pulse: present    Comments:  Baseline range of motion, positive quad weakness      Back Exam     Tenderness   Back tenderness location: none.    Range of Motion   The patient has normal back ROM.    Other   Sensation: normal  Erythema: no back redness    Comments:  Patient ambulates with purposeful gait, hold onto walls and monitor.  Mild to moderate bilateral lower leg weakness, supposed to do with quad strength noted.  Difficulty with single-leg stand and squat.  Distal neurovascular intact.            Image Results:      Assessment/Plan   Encounter Diagnoses:  Problem List Items Addressed This Visit             ICD-10-CM    Leg weakness, bilateral R29.898     We discussed position change in home safety.  I did recommend cane use for support on as needed basis.  We discussed no orthopedic identified during today's visit.  Patient does demonstrate lower extremity weakness and will initiate PT for strengthening and balance concerns.  Patient and spouse referred to follow-up with Dr. Laird regarding concern for adverse effects of Farxiga, will also provide a copy of today's visit.  Recommended follow-up with PCP regarding concerns for long-haul COVID symptoms exacerbating balance disturbance and lower extremity strength possible neurology referral.,   Plan for follow-up.  On as needed basis for any bone or joint concerns.  Patient and family in agreement with plan of care  This note was generated using Dragon software.  It may contain errors in wording, punctuation or spelling.          Relevant Orders    Follow Up In Orthopaedic Surgery    Referral to Physical Therapy    Balance disorder - Primary R26.89    Relevant Orders    Follow Up In Orthopaedic Surgery    Referral to Physical Therapy

## 2024-02-26 NOTE — PROGRESS NOTES
PATIENT HAS HAD WEAKNESS RECENTLY AND IS FALLING WHEN GOING FROM SITTING TO STANDING.  HAD ORTHO APPOINTMENT AND WAS TOLD NOTHING WRONG THERE BUT SOMETIMES THE FARXIGA MEDICATION CAN CAUSE WEAKNESS.  HE STOPPED TAKING THE OTHER DAY AND FEELS BETTER BUT THEY ARE WORRIED ABOUT HIS KIDNEYS.  PLEASE ADVISE WHAT THEY SHOULD DO?  GO BACK ON MEDICATION , CUT IN HALF OR GO TO LOWER DOSAGE MAYBE?    [phone]

## 2024-03-01 ASSESSMENT — ENCOUNTER SYMPTOMS: WEAKNESS: 1

## 2024-03-01 NOTE — ASSESSMENT & PLAN NOTE
We discussed position change in home safety.  I did recommend cane use for support on as needed basis.  We discussed no orthopedic identified during today's visit.  Patient does demonstrate lower extremity weakness and will initiate PT for strengthening and balance concerns.  Patient and spouse referred to follow-up with Dr. Laird regarding concern for adverse effects of Farxiga, will also provide a copy of today's visit.  Recommended follow-up with PCP regarding concerns for long-haul COVID symptoms exacerbating balance disturbance and lower extremity strength possible neurology referral.,   Plan for follow-up.  On as needed basis for any bone or joint concerns.  Patient and family in agreement with plan of care  This note was generated using Dragon software.  It may contain errors in wording, punctuation or spelling.

## 2024-03-19 ENCOUNTER — EVALUATION (OUTPATIENT)
Dept: PHYSICAL THERAPY | Facility: CLINIC | Age: 74
End: 2024-03-19
Payer: MEDICARE

## 2024-03-19 DIAGNOSIS — R29.898 LEG WEAKNESS, BILATERAL: Primary | ICD-10-CM

## 2024-03-19 DIAGNOSIS — R26.89 BALANCE DISORDER: ICD-10-CM

## 2024-03-19 PROCEDURE — 97110 THERAPEUTIC EXERCISES: CPT | Mod: GP

## 2024-03-19 PROCEDURE — 97161 PT EVAL LOW COMPLEX 20 MIN: CPT | Mod: GP

## 2024-03-19 ASSESSMENT — PAIN - FUNCTIONAL ASSESSMENT: PAIN_FUNCTIONAL_ASSESSMENT: 0-10

## 2024-03-19 ASSESSMENT — PATIENT HEALTH QUESTIONNAIRE - PHQ9
2. FEELING DOWN, DEPRESSED OR HOPELESS: NOT AT ALL
1. LITTLE INTEREST OR PLEASURE IN DOING THINGS: NOT AT ALL
SUM OF ALL RESPONSES TO PHQ9 QUESTIONS 1 AND 2: 0

## 2024-03-19 ASSESSMENT — ENCOUNTER SYMPTOMS
OCCASIONAL FEELINGS OF UNSTEADINESS: 0
DEPRESSION: 0
LOSS OF SENSATION IN FEET: 1

## 2024-03-19 ASSESSMENT — PAIN SCALES - GENERAL: PAINLEVEL_OUTOF10: 0 - NO PAIN

## 2024-03-19 NOTE — PROGRESS NOTES
Physical Therapy    Physical Therapy Evaluation and Treatment      Patient Name: Pato Christiansen  MRN: [id number]  Today's Date: 3/19/2024  Time Calculation  Start Time: 1015  Stop Time: 1050  Time Calculation (min): 35 min    Assessment:    Pato Christianesn, a 74 y.o. male, arrives to outpatient PT c/o B LE musculature weakness and hx of falls. Pt presents with the following impairments: B LE musculature strength weakness, deficits in SLS stability, deficits in stability on compliant surface, deficits in functional strength per 5xSTS, deficits in dynamic stability per TUG score, and deficits in static and dynamic stability per ABC scale score. These impairments contribute to difficulty in activity limitations and participation restrictions including transfers, ambulation, household management, community engagement, and engagement in hobbies. The pt will benefit from skilled PT services 2x/week for 3 weeks to address the above stated impairments and functional limitations to maximize participation and ease in household and social related activities. The pt has a good prognosis when considering positive factors including age and PLOF with barriers such as chronicity of weakness. Educated in standing hip strengthening exercises with verbal cues for upright posture as well as proper form with completion to minimize compensation. Patient demonstrated and verbalized understanding. HEP handout provided. No change in pain post-treatment. The pt verbalized understanding and agreement to goals and POC. Thank you for this referral and please call [phone] with any questions or concerns.    Plan:   OP PT Plan  Treatment/Interventions: Education/ Instruction, Neuromuscular re-education, Gait training, Self care/ home management, Therapeutic exercises, Therapeutic activities  PT Plan: Skilled PT  PT Frequency: 2 times per week  Duration: 3 weeks for 6 additional visits in POC  Onset Date: 09/01/23  Certification Period Start Date:  "24  Certification Period End Date: 24  Rehab Potential: Good  Plan of Care Agreement: Patient      Current Problem:   1. Leg weakness, bilateral  Referral to Physical Therapy    Follow Up In Physical Therapy      2. Balance disorder  Referral to Physical Therapy    Follow Up In Physical Therapy          Subjective      General  Reason for Referral: Balance Disorder  Referred By: King BYERS  POC:   General: Patient presenting with B LE musculature weakness. Patient fell x2 in quick succession within 8 days. Followed up with ortho who recommended balance training. Feels that it began last year when he had COVID. Feels that he is not as strong as he was prior to COVID and cannot keep up same majo as wife with walking. Also somewhat inactive over the winter. Patient reports no numbness/tingling except for his neuropathy. Does mow the yard and walk the dog but wife notes not as fast with his majo.    Patient Stated Goal: \"Muscle strength to improve balance\"  Medical History Questionnaire reviewed with patient  No barriers to care or learning    Precautions:  Precautions  STEADI Fall Risk Score (The score of 4 or more indicates an increased risk of falling): 4  Precautions Comment: Hx of diabetes  Pain:  Pain Assessment  Pain Assessment: 0-10  Pain Score: 0 - No pain  Home Livin stories with 1 HR ascending for ease stair case  Prior Level of Function:   Independent in all ADLs/iADLs with no restriction    Objective     OBJECTIVE:    Lower Extremity Strength:  MMT 5/5 max  RIGHT LEFT   Hip Flexion 4 4   Hip Extension 4 4   Hip Abduction 4 4   Hip Adduction 4 4   Knee Extension 5 5   Knee Flexion 4 4   Ankle DF 4 4   Ankle PF 5 5     SLS: 1 second each LE requires UE support on level surface  NBOS: mod sway on compliant surface with no UE support  Normal CAROLYN: mod sway on compliant surface EC      Outcome Measures:  Other Measures  Activities - Specific Balance Confidence Scale: 82%   5xSTS: " 26.37 second  TU.12 seconds    Treatments:  Low Complex Evaluation  Therapeutic Exercise  Side stepping at plinth x3 directions each  Standing hip abduction x10 each LE  Standing hip extension x10 each LE  Verbal review of sit<>stand exercise  Mini squat x10    EDUCATION:   Access Code: [email or web address]  URL: [email or web address]  Date: 2024  Prepared by: Yoanna Abbott    Exercises  - Side Stepping with Counter Support  - 1 x daily - 7 x weekly - 1-2 sets - 10 reps  - Standing Hip Abduction with Counter Support  - 1 x daily - 7 x weekly - 1-2 sets - 10 reps  - Standing Hip Extension with Counter Support  - 1 x daily - 7 x weekly - 1-2 sets - 10 reps  - Sit to Stand with Counter Support  - 1 x daily - 7 x weekly - 1-2 sets - 10 reps  - Mini Squat with Counter Support  - 1 x daily - 7 x weekly - 1-2 sets - 10 reps    Goals:  TUG score improvement by 4 seconds to demonstrate improved dynamic stability with ambulation to reduce risk of falls-Week 4  Increase in ABC scale score by 10% to demonstrate improved static and dynamic stability with ADL/iADL completion-Week 4  Decrease in 5xSTS score by 5 seconds to demonstrate improved functional strength of B LE for reducing fall risk at home and community-Week 4  Increase in B LE musculature strength 5/5 MMT to improve stability in standing/ambulation to reduce risk of falls-Week 4  Patient will demonstrate compliance in their home exercise program in order to promote independence in self management of functional mobility.-Week 2

## 2024-03-21 ENCOUNTER — TREATMENT (OUTPATIENT)
Dept: PHYSICAL THERAPY | Facility: CLINIC | Age: 74
End: 2024-03-21
Payer: MEDICARE

## 2024-03-21 DIAGNOSIS — R26.89 BALANCE DISORDER: ICD-10-CM

## 2024-03-21 DIAGNOSIS — R29.898 LEG WEAKNESS, BILATERAL: ICD-10-CM

## 2024-03-21 PROCEDURE — 97110 THERAPEUTIC EXERCISES: CPT | Mod: GP,CQ

## 2024-03-21 ASSESSMENT — PAIN SCALES - GENERAL: PAINLEVEL_OUTOF10: 0 - NO PAIN

## 2024-03-21 ASSESSMENT — PAIN - FUNCTIONAL ASSESSMENT: PAIN_FUNCTIONAL_ASSESSMENT: 0-10

## 2024-03-21 NOTE — PROGRESS NOTES
"Physical Therapy Treatment    Patient Name: Pato Christiansen  MRN: [id number]  Today's Date: 3/21/2024  Time Calculation  Start Time: 0830  Stop Time: 0913  Time Calculation (min): 43 min  PT Therapeutic Procedures Time Entry  Therapeutic Exercise Time Entry: 39         Current Problem  Problem List Items Addressed This Visit             ICD-10-CM    Leg weakness, bilateral R29.898    Balance disorder R26.89       Subjective   Pt.'s name and birthday confirmed.   Pt. Reports decreased pain since previous visit.  No reports of recent falls.  Pt. Denies any pain this date.    Reason for Referral: Balance Disorder  Referred By: King APRN-CNP  General Comment: POC: 2/7      Precautions  Precautions  Precautions Comment: Hx of diabetes      Pain  Pain Assessment: 0-10  Pain Score: 0 - No pain      Treatments: 39 mins, 3 units  Therapeutic Exercise  Stepper Lv 1 x5 mins UE/LE  (N)  Seated LAQ's 2 x10 B (N)  Seated HS Curls Green band B 2 x10 (N)  Seated Hip Add., playball  3\" hold  (N)  Seated Hip Abd., green band 2 x10 (N)  Seated Marches, alt 2 x10 (N)  Bridges 2 x10 (N)  Supine SLR's B 2 x10 (N)  Side stepping at plinth x3 directions each  Standing hip abduction 2 x10 each LE (P)  Standing hip extension  2 x10 each LE (P)  Verbal review of sit<>stand exercise  Mini squat 2 x10  (p)        Assessment:  Good tolerance with TE this date.  Verbal cues needed with keeping left foot in neutral position with standing TE.  No c/o sx.'s/fatigue noted this date.  Added and/or progressed exercises with ease.  Handout provided and reviewed with patient.       Plan:  Continue with LE strengthening, balance and flexibility per tolerance to improve prolonged ambulation and climbing stairs with little to no difficulty.  MB      OP PT Plan  Treatment/Interventions: Education/ Instruction, Neuromuscular re-education, Gait training, Self care/ home management, Therapeutic exercises, Therapeutic activities  PT Plan: Skilled PT  PT Frequency: " 2 times per week  Duration: 3 weeks for 6 additional visits in POC  Onset Date: 09/01/23  Certification Period Start Date: 03/19/24  Certification Period End Date: 06/17/24  Rehab Potential: Good  Plan of Care Agreement: Patient        OP EDUCATION:  Access Code: [email or web address]  URL: [email or web address]  Date: 03/21/2024  Prepared by: Brittni León    Exercises  - Supine Bridge  - 1 x daily - 7 x weekly - 2 sets - 10 reps  - Supine Active Straight Leg Raise  - 1 x daily - 7 x weekly - 2 sets - 10 reps  - Seated Long Arc Quad  - 1 x daily - 7 x weekly - 2 sets - 10 reps  - Seated Hip Adduction Isometrics with Ball  - 1 x daily - 7 x weekly - 2 sets - 10 reps  - Seated Hip Abduction with Resistance  - 1 x daily - 7 x weekly - 2 sets - 10 reps  - Seated Hamstring Curl with Anchored Resistance  - 1 x daily - 7 x weekly - 2 sets - 10 reps  - Seated March  - 1 x daily - 7 x weekly - 2 sets - 10 reps      Access Code: [email or web address]  URL: [email or web address]  Date: 03/19/2024  Prepared by: Yoanna Abbott     Exercises  - Side Stepping with Counter Support  - 1 x daily - 7 x weekly - 1-2 sets - 10 reps  - Standing Hip Abduction with Counter Support  - 1 x daily - 7 x weekly - 1-2 sets - 10 reps  - Standing Hip Extension with Counter Support  - 1 x daily - 7 x weekly - 1-2 sets - 10 reps  - Sit to Stand with Counter Support  - 1 x daily - 7 x weekly - 1-2 sets - 10 reps  - Mini Squat with Counter Support  - 1 x daily - 7 x weekly - 1-2 sets - 10 reps       Goals:  Active       PT Problem       PT Goals       Start:  03/21/24    Expected End:  04/19/24       TUG score improvement by 4 seconds to demonstrate improved dynamic stability with ambulation to reduce risk of falls-Week 4  Increase in ABC scale score by 10% to demonstrate improved static and dynamic stability with ADL/iADL completion-Week 4  Decrease in 5xSTS score by 5 seconds to demonstrate improved functional strength of B  LE for reducing fall risk at home and community-Week 4  Increase in B LE musculature strength 5/5 MMT to improve stability in standing/ambulation to reduce risk of falls-Week 4  Patient will demonstrate compliance in their home exercise program in order to promote independence in self management of functional mobility.-Week 2

## 2024-03-27 ENCOUNTER — TREATMENT (OUTPATIENT)
Dept: PHYSICAL THERAPY | Facility: CLINIC | Age: 74
End: 2024-03-27
Payer: MEDICARE

## 2024-03-27 DIAGNOSIS — R26.89 BALANCE DISORDER: Primary | ICD-10-CM

## 2024-03-27 DIAGNOSIS — R29.898 LEG WEAKNESS, BILATERAL: ICD-10-CM

## 2024-03-27 PROCEDURE — 97110 THERAPEUTIC EXERCISES: CPT | Mod: GP,CQ

## 2024-03-27 ASSESSMENT — PAIN SCALES - GENERAL: PAINLEVEL_OUTOF10: 0 - NO PAIN

## 2024-03-27 ASSESSMENT — PAIN - FUNCTIONAL ASSESSMENT: PAIN_FUNCTIONAL_ASSESSMENT: 0-10

## 2024-03-27 NOTE — PROGRESS NOTES
"Physical Therapy Treatment    Patient Name: Pato Christiansen  MRN: [id number]  Today's Date: 3/27/2024  Time Calculation  Start Time: 832  Stop Time: 914  Time Calculation (min): 42 min      Assessment:   Patient identified with name and .   Good tolerance to session with only mild fatigue afterwards, however challenged with current ex's. Occasional instruction to perform ex's correctly and reduce compensations. Rehab goals in progress.     Plan:  Continue with LE strengthening, balance and flexibility per tolerance to improve prolonged ambulation and climbing stairs with little to no difficulty.     OP PT Plan  Treatment/Interventions: Education/ Instruction, Neuromuscular re-education, Gait training, Self care/ home management, Therapeutic exercises, Therapeutic activities  PT Plan: Skilled PT  PT Frequency: 2 times per week  Duration: 3 weeks for 6 additional visits in POC  Onset Date: 23  Certification Period Start Date: 24  Certification Period End Date: 24  Rehab Potential: Good  Plan of Care Agreement: Patient    Current Problem  Problem List Items Addressed This Visit             ICD-10-CM    Leg weakness, bilateral R29.898    Balance disorder - Primary R26.89       Subjective   Not hurting at all this morning. Feeling pretty good, overall, but still some weakness in the legs.     Precautions  Precautions  Precautions Comment: Hx of diabetes    Pain  Pain Assessment: 0-10  Pain Score: 0 - No pain    Treatments:  Treatments: 40 mins, 3 units  Therapeutic Exercise  Stepper Lv 1 x6 mins UE/LE    Seated LAQ's 2 x10 B, 4#  [P]  Seated HS Curls Green band B 2 x10   Seated Hip Add., playball  3\" hold  [X - no time]  Seated Hip Abd., green band 2 x10  [X - no time]  Seated Marches, alt 2 x10   Bridges 2 x10  Supine SLR's B 2 x10   Side stepping at plinth x3 directions each  Standing hip abduction 2 x10 each LE  Standing hip extension  2 x10 each LE   Verbal review of sit<>stand exercise  Mini squat " 2 x10      OP EDUCATION:   Access Code: [email or web address]  URL: [email or web address]  Date: 03/21/2024  Prepared by: Brittni León     Exercises  - Supine Bridge  - 1 x daily - 7 x weekly - 2 sets - 10 reps  - Supine Active Straight Leg Raise  - 1 x daily - 7 x weekly - 2 sets - 10 reps  - Seated Long Arc Quad  - 1 x daily - 7 x weekly - 2 sets - 10 reps  - Seated Hip Adduction Isometrics with Ball  - 1 x daily - 7 x weekly - 2 sets - 10 reps  - Seated Hip Abduction with Resistance  - 1 x daily - 7 x weekly - 2 sets - 10 reps  - Seated Hamstring Curl with Anchored Resistance  - 1 x daily - 7 x weekly - 2 sets - 10 reps  - Seated March  - 1 x daily - 7 x weekly - 2 sets - 10 reps        Access Code: [email or web address]  URL: [email or web address]  Date: 03/19/2024  Prepared by: Yoanna Abbott     Exercises  - Side Stepping with Counter Support  - 1 x daily - 7 x weekly - 1-2 sets - 10 reps  - Standing Hip Abduction with Counter Support  - 1 x daily - 7 x weekly - 1-2 sets - 10 reps  - Standing Hip Extension with Counter Support  - 1 x daily - 7 x weekly - 1-2 sets - 10 reps  - Sit to Stand with Counter Support  - 1 x daily - 7 x weekly - 1-2 sets - 10 reps  - Mini Squat with Counter Support  - 1 x daily - 7 x weekly - 1-2 sets - 10 reps     Goals:  Active       PT Problem       PT Goals       Start:  03/21/24    Expected End:  04/19/24       TUG score improvement by 4 seconds to demonstrate improved dynamic stability with ambulation to reduce risk of falls-Week 4  Increase in ABC scale score by 10% to demonstrate improved static and dynamic stability with ADL/iADL completion-Week 4  Decrease in 5xSTS score by 5 seconds to demonstrate improved functional strength of B LE for reducing fall risk at home and community-Week 4  Increase in B LE musculature strength 5/5 MMT to improve stability in standing/ambulation to reduce risk of falls-Week 4  Patient will demonstrate compliance in their  home exercise program in order to promote independence in self management of functional mobility.-Week 2

## 2024-04-02 ENCOUNTER — TREATMENT (OUTPATIENT)
Dept: PHYSICAL THERAPY | Facility: CLINIC | Age: 74
End: 2024-04-02
Payer: MEDICARE

## 2024-04-02 DIAGNOSIS — R29.898 LEG WEAKNESS, BILATERAL: ICD-10-CM

## 2024-04-02 DIAGNOSIS — R26.89 BALANCE DISORDER: ICD-10-CM

## 2024-04-02 PROCEDURE — 97110 THERAPEUTIC EXERCISES: CPT | Mod: GP,CQ

## 2024-04-02 ASSESSMENT — PAIN - FUNCTIONAL ASSESSMENT: PAIN_FUNCTIONAL_ASSESSMENT: 0-10

## 2024-04-02 ASSESSMENT — PAIN SCALES - GENERAL: PAINLEVEL_OUTOF10: 0 - NO PAIN

## 2024-04-02 NOTE — PROGRESS NOTES
"Physical Therapy Treatment    Patient Name: Pato Christiansen  MRN: [id number]  Today's Date: 4/2/2024  Time Calculation  Start Time: 0830  Stop Time: 0913  Time Calculation (min): 43 min  PT Therapeutic Procedures Time Entry  Therapeutic Exercise Time Entry: 40         Current Problem  Problem List Items Addressed This Visit             ICD-10-CM    Leg weakness, bilateral R29.898    Balance disorder R26.89       Subjective   Pt.'s name and birthday confirmed.  Pt. States he is doing his exercises every night at home.  No c/o pain other than his shoulder hurts.    Reason for Referral: Balance Disorder  Referred By: King APRN-CNP  General Comment: POC: 4/7      Precautions  Precautions  Precautions Comment: Hx of diabetes      Pain  Pain Assessment: 0-10  Pain Score: 0 - No pain      Treatments:  40 mins  Therapeutic Exercise  Stepper Lv 1 x6 mins UE/LE     Step ups 6\" 2 x10   Seated LAQ's 2 x10 B, 4#   Seated HS Curls Green band B 2 x10  (X no time)  Seated Hip Add., playball  3\" hold   Seated Hip Abd., blue band 2 x10  (P)  Seated Marches w/blue band (P resistance), alt 2 x10   Bridges 2 x10 (X) no time  Supine SLR's B 2 x10   Side stepping at plinth x3 directions each  Tandem gait x2 laps ea way (N)  Retro gait x2 laps ea way (N)  Standing hip abduction 2 x10 each LE  Standing hip extension  2 x10 each LE   Verbal review of sit<>stand exercise  Mini squat 2 x10     Step up Fwd/Lat to BOSU x10 ea LE (N)  Cone taps x3 cones, x10 ea LE (N)        Assessment:  Trial balance activities this date which patient was very challenged.  B UE support needed; tactile cues needed as well.  Fatigue noted with squats and step ups this date.  Pt. Demo's slight confusion with TE today and needed a lot of cueing today with form/sequencing.       Plan:  Continue with strengthening, ROM and flexibility per tolerance to improve daily activities with little to no difficulty.  MB   OP PT Plan  Treatment/Interventions: Education/ " Instruction, Neuromuscular re-education, Gait training, Self care/ home management, Therapeutic exercises, Therapeutic activities  PT Plan: Skilled PT  PT Frequency: 2 times per week  Duration: 3 weeks for 6 additional visits in POC  Onset Date: 09/01/23  Certification Period Start Date: 03/19/24  Certification Period End Date: 06/17/24  Rehab Potential: Good  Plan of Care Agreement: Patient        OP EDUCATION:   Access Code: [email or web address]  URL: [email or web address]  Date: 03/21/2024  Prepared by: Brittni León     Access Code: [email or web address]  URL: [email or web address]  Date: 03/19/2024  Prepared by: Yoanna Abbott    Goals:  Active       PT Problem       PT Goals       Start:  03/21/24    Expected End:  04/19/24       TUG score improvement by 4 seconds to demonstrate improved dynamic stability with ambulation to reduce risk of falls-Week 4  Increase in ABC scale score by 10% to demonstrate improved static and dynamic stability with ADL/iADL completion-Week 4  Decrease in 5xSTS score by 5 seconds to demonstrate improved functional strength of B LE for reducing fall risk at home and community-Week 4  Increase in B LE musculature strength 5/5 MMT to improve stability in standing/ambulation to reduce risk of falls-Week 4  Patient will demonstrate compliance in their home exercise program in order to promote independence in self management of functional mobility.-Week 2

## 2024-04-04 ENCOUNTER — OFFICE VISIT (OUTPATIENT)
Dept: ORTHOPEDIC SURGERY | Facility: CLINIC | Age: 74
End: 2024-04-04
Payer: MEDICARE

## 2024-04-04 DIAGNOSIS — M19.012 LOCALIZED OSTEOARTHRITIS OF SHOULDER, LEFT: ICD-10-CM

## 2024-04-04 DIAGNOSIS — M25.512 TRIGGER POINT OF LEFT SHOULDER REGION: Primary | ICD-10-CM

## 2024-04-04 PROCEDURE — 99213 OFFICE O/P EST LOW 20 MIN: CPT | Performed by: NURSE PRACTITIONER

## 2024-04-04 PROCEDURE — 1125F AMNT PAIN NOTED PAIN PRSNT: CPT | Performed by: NURSE PRACTITIONER

## 2024-04-04 PROCEDURE — 1159F MED LIST DOCD IN RCRD: CPT | Performed by: NURSE PRACTITIONER

## 2024-04-04 PROCEDURE — 20552 NJX 1/MLT TRIGGER POINT 1/2: CPT | Performed by: NURSE PRACTITIONER

## 2024-04-04 PROCEDURE — 1160F RVW MEDS BY RX/DR IN RCRD: CPT | Performed by: NURSE PRACTITIONER

## 2024-04-04 PROCEDURE — 1036F TOBACCO NON-USER: CPT | Performed by: NURSE PRACTITIONER

## 2024-04-04 PROCEDURE — 4010F ACE/ARB THERAPY RXD/TAKEN: CPT | Performed by: NURSE PRACTITIONER

## 2024-04-04 ASSESSMENT — ENCOUNTER SYMPTOMS
CONSTITUTIONAL NEGATIVE: 1
NEUROLOGICAL NEGATIVE: 1
CARDIOVASCULAR NEGATIVE: 1
ARTHRALGIAS: 1
HEMATOLOGIC/LYMPHATIC NEGATIVE: 1
ENDOCRINE NEGATIVE: 1
RESPIRATORY NEGATIVE: 1
PSYCHIATRIC NEGATIVE: 1

## 2024-04-04 ASSESSMENT — PAIN SCALES - GENERAL: PAINLEVEL_OUTOF10: 7

## 2024-04-04 ASSESSMENT — PAIN - FUNCTIONAL ASSESSMENT: PAIN_FUNCTIONAL_ASSESSMENT: 0-10

## 2024-04-04 ASSESSMENT — PAIN DESCRIPTION - DESCRIPTORS: DESCRIPTORS: SHARP;SHOOTING

## 2024-04-04 NOTE — PROGRESS NOTES
Subjective    Patient ID: Pato Christiansen is a 74 y.o. male.    Chief Complaint: Follow-up and Pain of the Left Shoulder (Patient had cortisone injection in this shoulder by Dr. Hall on 0920/2023.)       BEN Whyte is a pleasant 75 yo presenting today for follow-up visit of left shoulder pain.  Last seen for this complaint with Dr. Hall or in fall 2023.  Patient and spouse report he received a cortisone injection at that time with good improvements, interested in pursuing another cortisone today.  No new injuries.    Review of Systems   Constitutional: Negative.    HENT: Negative.     Respiratory: Negative.     Cardiovascular: Negative.    Endocrine: Negative.    Musculoskeletal:  Positive for arthralgias.   Skin: Negative.    Neurological: Negative.    Hematological: Negative.    Psychiatric/Behavioral: Negative.         Objective   Right Shoulder Exam   Right shoulder exam is normal.      Left Shoulder Exam     Tenderness   Left shoulder tenderness location: Mid upper trap, +2 cm round trigger point.    Range of Motion   Active abduction:  normal   Extension:  normal   External rotation:  normal   Forward flexion:  normal     Other   Erythema: absent  Sensation: normal  Pulse: present     Comments:  Skin is pink, warm, dry and intact.  Symptoms mildly aggravated with full range of motion of shoulder and neck to the mid upper trap.  Full range of motion of distal joints with distal neurovascular intact, cap refill at 2 to 3 seconds.          Image Results:  XR cervical spine 2-3 views  Narrative: Interpreted By:  Gustavo Vyas,   STUDY:  XR CERVICAL SPINE 2-3 VIEWS;  2/26/2024 1:52 pm      INDICATION:  Signs/Symptoms: PAIN  M25.512: Left shoulder pain.      COMPARISON:  None.      ACCESSION NUMBER(S):  [id number]      ORDERING CLINICIAN:  GERSON DONOVAN      FINDINGS:  No acute compression fracture is seen. No subluxation is noted. Facet  joints demonstrate a normal alignment. Prevertebral soft tissues are  within  normal limits.  Degenerative changes which predominate involve  the C6-C7 level including disc space narrowing, endplate spurring,  endplate sclerosis. There is diffuse facet arthrosis with  hypertrophy. No lytic or blastic lesion is seen.      Impression: No evidence of an acute fracture or subluxation. Degenerative changes.          MACRO:  None.      Signed by: Gustavo Vyas 2/27/2024 1:42 PM  Dictation workstation:   [id number]    Lab Results   Component Value Date    HGBA1C 7.1 (H) 12/01/2023      Patient ID: Pato Christiansen is a 74 y.o. male.    Inject Trigger Point, 1 or 2    Date/Time: 4/4/2024 10:09 AM    Performed by: MODESTA Kumar  Authorized by: MODESTA Kumar  Local anesthesia used: yes    Anesthesia:  Local anesthesia used: yes  Local Anesthetic: topical anesthetic and lidocaine 1% without epinephrine  Anesthetic total: 0.5 mL    Sedation:  Patient sedated: no    Patient tolerance: patient tolerated the procedure well with no immediate complications  Comments: Trigger point injection was administered using 4 mg Decadron, 0.5 cc 1% lidocaine and 0.5 cc 0.25% bupivacaine.  The injection was administered directly to center of trigger point, then fanned route.  The injection was well-tolerated with no adverse effects, positive lidocaine suppression prior to today's DC.  Patient states no radiation of pain at this time.       Assessment/Plan   Encounter Diagnoses:  Problem List Items Addressed This Visit             ICD-10-CM    Trigger point of left shoulder region - Primary M25.512     Trigger point injection was tolerated well with positive lidocaine suppression.  We discussed site massage with direct massage, use of diclofenac topical 4x/daily and tennis ball beginning this evening. Patient may advance as tolerated.  We discussed several self-care options for symptom control.  I did offer a formal PT eval and teach home exercise program, at this time patient and family want to try  plan at home.  They are aware they can contact the office if it is not effective or have any concerns in the next month and we can add the PT into her the plan.  Plan will be to follow-up here on as needed basis for changes or concerns.    Educated to monitor qd blood sugars x 10 days. Drink plenty of water and unsweetened fluids, avoid/limit added sugars and refined carbohydrates. Call your PCP for any concerning symptoms or elevated readings.  Patient and family in agreement with plan of care.  This note was generated using Dragon software.  It may contain errors in wording, punctuation or spelling.         Relevant Medications    dexAMETHasone (Decadron) injection 4 mg (Start on 4/4/2024 10:30 AM)    Other Relevant Orders    Follow Up In Orthopaedic Surgery    Localized osteoarthritis of shoulder, left M19.012    Relevant Orders    Follow Up In Orthopaedic Surgery

## 2024-04-04 NOTE — ASSESSMENT & PLAN NOTE
Trigger point injection was tolerated well with positive lidocaine suppression.  We discussed site massage with direct massage, use of diclofenac topical 4x/daily and tennis ball beginning this evening. Patient may advance as tolerated.  We discussed several self-care options for symptom control.  I did offer a formal PT eval and teach home exercise program, at this time patient and family want to try plan at home.  They are aware they can contact the office if it is not effective or have any concerns in the next month and we can add the PT into her the plan.  Plan will be to follow-up here on as needed basis for changes or concerns.    Educated to monitor qd blood sugars x 10 days. Drink plenty of water and unsweetened fluids, avoid/limit added sugars and refined carbohydrates. Call your PCP for any concerning symptoms or elevated readings.  Patient and family in agreement with plan of care.  This note was generated using Dragon software.  It may contain errors in wording, punctuation or spelling.

## 2024-04-05 ENCOUNTER — TREATMENT (OUTPATIENT)
Dept: PHYSICAL THERAPY | Facility: CLINIC | Age: 74
End: 2024-04-05
Payer: MEDICARE

## 2024-04-05 DIAGNOSIS — R29.898 LEG WEAKNESS, BILATERAL: ICD-10-CM

## 2024-04-05 DIAGNOSIS — R26.89 BALANCE DISORDER: ICD-10-CM

## 2024-04-05 PROCEDURE — 97110 THERAPEUTIC EXERCISES: CPT | Mod: GP

## 2024-04-05 ASSESSMENT — PAIN SCALES - GENERAL: PAINLEVEL_OUTOF10: 2

## 2024-04-05 ASSESSMENT — PAIN - FUNCTIONAL ASSESSMENT: PAIN_FUNCTIONAL_ASSESSMENT: 0-10

## 2024-04-05 NOTE — PROGRESS NOTES
"Physical Therapy Treatment    Patient Name: Pato Christiansen  MRN: [id number]  Today's Date: 4/5/2024  Time Calculation  Start Time: 0830  Stop Time: 0912  Time Calculation (min): 42 min  PT Therapeutic Procedures Time Entry  Therapeutic Exercise Time Entry: 39         Current Problem  Problem List Items Addressed This Visit             ICD-10-CM    Leg weakness, bilateral R29.898    Balance disorder R26.89         Subjective  Patient reports he might be more tired this date due to being up all night after his cortisone injection. His sugar had been elevated last night. Wife says he is compliant in exercises and patient reports feeling stronger.    Reason for Referral: Balance Disorder  Referred By: King BYERS  General Comment: POC: 5/7      Precautions  Precautions  Precautions Comment: Hx of diabetes      Pain  Pain Assessment: 0-10  Pain Score: 2  Pain Location: Shoulder  Pain Orientation: Left      Treatments:    Therapeutic Exercise 39 mins, 3 units  Stepper Lv41 x6 mins UE/LE   P resist  Step ups 6\" 2 x10   Seated HS Curls blue band each LE 2 x10 P  Seated Hip Add., playball  3\" hold 2x10  Seated Hip Abd., black band 2 x10  (P)  Seated Marches w/black band (P resistance), alt 2 x10   Side stepping at plinth x3 directions each  Marches on airex x20 N  Tandem gait x2 laps ea way  Retro gait x2 laps ea way  Standing hip abduction 2 x10 each LE  Standing hip extension  2 x10 each LE   Standing heel/toe raises 2x20 N  Mini squat on airex 2 x10 P    Fwd lunge BOSU x15 ea LE (P)  Lateral lunge x10 each LE N  Cone taps x3 cones, x10 ea LE (X)  Seated LAQ's 2 x10 B, 4# X  Bridges 2 x10 (X)   Supine SLR's B 2 x10 X    Assessment:  Good tolerance to standing and seated strengthening and stability exercises. Verbal cues to increase amplitude of march when on airex. Requires 1-2 UE support for stability on airex. Demonstrates short step length with tandem and backward walking. No increase in pain at end of session.  "       Plan:  Progress with LE strengthening/stability to improve ease with transfers and ambulation  OP PT Plan  Treatment/Interventions: Education/ Instruction, Neuromuscular re-education, Gait training, Self care/ home management, Therapeutic exercises, Therapeutic activities  PT Plan: Skilled PT  PT Frequency: 2 times per week  Duration: 3 weeks for 6 additional visits in POC  Onset Date: 09/01/23  Certification Period Start Date: 03/19/24  Certification Period End Date: 06/17/24  Rehab Potential: Good  Plan of Care Agreement: Patient        OP EDUCATION:   Access Code: [email or web address]  URL: [email or web address]  Date: 03/21/2024  Prepared by: Brittni León     Access Code: [email or web address]  URL: [email or web address]  Date: 03/19/2024  Prepared by: Yoanna Abbott    Goals:  Active       PT Problem       PT Goals       Start:  03/21/24    Expected End:  04/19/24       TUG score improvement by 4 seconds to demonstrate improved dynamic stability with ambulation to reduce risk of falls-Week 4  Increase in ABC scale score by 10% to demonstrate improved static and dynamic stability with ADL/iADL completion-Week 4  Decrease in 5xSTS score by 5 seconds to demonstrate improved functional strength of B LE for reducing fall risk at home and community-Week 4  Increase in B LE musculature strength 5/5 MMT to improve stability in standing/ambulation to reduce risk of falls-Week 4  Patient will demonstrate compliance in their home exercise program in order to promote independence in self management of functional mobility.-Week 2

## 2024-04-08 ENCOUNTER — TREATMENT (OUTPATIENT)
Dept: PHYSICAL THERAPY | Facility: CLINIC | Age: 74
End: 2024-04-08
Payer: MEDICARE

## 2024-04-08 DIAGNOSIS — R29.898 LEG WEAKNESS, BILATERAL: ICD-10-CM

## 2024-04-08 DIAGNOSIS — R26.89 BALANCE DISORDER: ICD-10-CM

## 2024-04-08 PROCEDURE — 97110 THERAPEUTIC EXERCISES: CPT | Mod: GP,CQ

## 2024-04-08 ASSESSMENT — PAIN SCALES - GENERAL: PAINLEVEL_OUTOF10: 0 - NO PAIN

## 2024-04-08 ASSESSMENT — PAIN - FUNCTIONAL ASSESSMENT: PAIN_FUNCTIONAL_ASSESSMENT: 0-10

## 2024-04-08 NOTE — PROGRESS NOTES
"Physical Therapy Treatment    Patient Name: Pato Christiansen  MRN: [id number]  Today's Date: 2024  Time Calculation  Start Time: 915  Stop Time: 956  Time Calculation (min): 41 min      Assessment:   Patient identified with name and .   Patient voices good compliance with current HEP. He's pleased with progression with therapy. Reviewed HEP -> patient with good understanding. He meets with supervising therapist next session for reassessment.       Plan:  Progress with LE strengthening/stability to improve ease with transfers and ambulation     OP PT Plan  Treatment/Interventions: Education/ Instruction, Neuromuscular re-education, Gait training, Self care/ home management, Therapeutic exercises, Therapeutic activities  PT Plan: Skilled PT  PT Frequency: 2 times per week  Duration: 3 weeks for 6 additional visits in POC  Onset Date: 23  Certification Period Start Date: 24  Certification Period End Date: 24  Rehab Potential: Good  Plan of Care Agreement: Patient    Current Problem  Problem List Items Addressed This Visit             ICD-10-CM    Leg weakness, bilateral R29.898    Balance disorder R26.89       Subjective   Feeling and not really having pain. Right shoulder bothers me a bit once in a while.     Precautions  Precautions  Precautions Comment: Hx of diabetes    Pain  Pain Assessment: 0-10  Pain Score: 0 - No pain    Treatments:  Therapeutic Exercise 39 mins, 3 units  Stepper Lv41 x6 mins UE/LE     Step ups 6\" 2 x10   Seated HS Curls blue band each LE 2 x10   Seated Hip Add., playball  3\" hold 2x10  Seated Hip Abd., black band 2 x10    Seated Marches w/black band (P resistance), alt 2 x10   Side stepping at plint x3 directions each X  Marches on airex x20   Tandem gait x2 laps ea way X  Retro gait x2 laps ea way X  Standing hip abduction 2 x10 each LE  Standing hip extension  2 x10 each LE   Standing heel/toe raises 2x20   Mini squat on airex 2 x10     Fwd lunge BOSU x15 ea LE "   Lateral lunge x10 each LE N  Cone taps x3 cones, x10 ea LE (X)  Seated LAQ's 2 x10 B, 4#   Bridges 2 x10    Supine SLR's B 2 x10 X    OP EDUCATION:    Access Code: [email or web address]  URL: [email or web address]  Date: 03/21/2024  Prepared by: Brittni León      Access Code: [email or web address]  URL: [email or web address]  Date: 03/19/2024  Prepared by: Yoanna Abbott       Goals:  Active       PT Problem       PT Goals       Start:  03/21/24    Expected End:  04/19/24       TUG score improvement by 4 seconds to demonstrate improved dynamic stability with ambulation to reduce risk of falls-Week 4  Increase in ABC scale score by 10% to demonstrate improved static and dynamic stability with ADL/iADL completion-Week 4  Decrease in 5xSTS score by 5 seconds to demonstrate improved functional strength of B LE for reducing fall risk at home and community-Week 4  Increase in B LE musculature strength 5/5 MMT to improve stability in standing/ambulation to reduce risk of falls-Week 4  Patient will demonstrate compliance in their home exercise program in order to promote independence in self management of functional mobility.-Week 2

## 2024-04-11 ENCOUNTER — TREATMENT (OUTPATIENT)
Dept: PHYSICAL THERAPY | Facility: CLINIC | Age: 74
End: 2024-04-11
Payer: MEDICARE

## 2024-04-11 DIAGNOSIS — R26.89 BALANCE DISORDER: ICD-10-CM

## 2024-04-11 DIAGNOSIS — R29.898 LEG WEAKNESS, BILATERAL: ICD-10-CM

## 2024-04-11 PROCEDURE — 97110 THERAPEUTIC EXERCISES: CPT | Mod: GP

## 2024-04-11 ASSESSMENT — PAIN - FUNCTIONAL ASSESSMENT: PAIN_FUNCTIONAL_ASSESSMENT: 0-10

## 2024-04-11 ASSESSMENT — PAIN SCALES - GENERAL: PAINLEVEL_OUTOF10: 0 - NO PAIN

## 2024-04-11 NOTE — PROGRESS NOTES
Physical Therapy Treatment    Patient Name: Pato Christiansen  MRN: [id number]  Today's Date: 4/11/2024  Time Calculation  Start Time: 0931  Stop Time: 0955  Time Calculation (min): 24 min      Assessment:   Pato Christiansen is progressing well through their POC addressing B LE musculature weakness and balance deficits. Pt has attended 7 PT sessions including initial evaluation with therapeutic exercise and HEP interventions. The pt demonstrates and verbalizes improvements in B LE musculature strength, functional strength per 5xSTS, functional stability per SLS, improved dynamic stability per TUG score, and improved subjective balance per ABC scale score. Patient is meeting all current PT goals. Review of current HEP with addition of balance exercises with counter support and UE support as needed. HEP handout provided. No change in pain post-treatment.    Pt is being placed on hold for 30 days to attempt performing their home exercise program independently. Pt instructed to contact with any problems, questions, or adjustments. This will serve as the patient's discharge if they elect not to resume skilled PT within 30 days. Pt verbalized understanding and agreement to goals and POC. Thank you for this referral and please call [phone] with any questions or concerns.       Plan:  Pt is being placed on hold for 30 days to attempt performing their home exercise program independently. Pt instructed to contact with any problems, questions, or adjustments. This will serve as the patient's discharge if they elect not to resume skilled PT within 30 days.     OP PT Plan  PT Plan: No Additional PT interventions required at this time  Onset Date: 09/01/23  Certification Period Start Date: 03/19/24  Certification Period End Date: 06/17/24  Rehab Potential: Good  Plan of Care Agreement: Patient    Current Problem  Problem List Items Addressed This Visit             ICD-10-CM    Leg weakness, bilateral R29.898    Balance disorder  "R26.89         Subjective   Patient reporting that he feels overall an improvement in his B LE musculature strength with less wobbling when he transfers into standing. Compliant in HEP and completes 1x/day. Reporting improvement in overall impairments and would like to continue with independent HEP.    Precautions  Precautions  Precautions Comment: Hx of diabetes    Pain  Pain Assessment: 0-10  Pain Score: 0 - No pain  OBJECTIVE:     Lower Extremity Strength:  MMT 5/5 max  RIGHT LEFT   Hip Flexion 4>5 4>5   Hip Extension 4>5 4>5   Hip Abduction 4>5 4>5   Hip Adduction 4>5 4>5   Knee Extension 5 5   Knee Flexion 4>5 4>5   Ankle DF 4>5 4>5   Ankle PF 5 5      SLS: 1 second each LE requires UE support on level surface>4 seconds 1 UE support  NBOS: mod sway on compliant surface with no UE support>min sway     Outcome Measures:  Other Measures  Activities - Specific Balance Confidence Scale: 82% >93%  5xSTS: 26.37 second>15.37 seconds  TU.12 seconds>9.22 seconds    Treatments:  Therapeutic Exercise 15 mins, 1 units  HEP and POC review  Semi tandem x20 each LE  NBOS x20 seconds  SLS with 1 UE x20 seconds each LE    Below not performed  Stepper Lv4 x6 mins UE/LE     Step ups 6\" 2 x10   Seated HS Curls blue band each LE 2 x10   Seated Hip Add., playball  3\" hold 2x10  Seated Hip Abd., black band 2 x10    Seated Marches w/black band (P resistance), alt 2 x10   Side stepping at plinth x3 directions each X  Marches on airex x20   Tandem gait x2 laps ea way X  Retro gait x2 laps ea way X  Standing hip abduction 2 x10 each LE  Standing hip extension  2 x10 each LE   Standing heel/toe raises 2x20   Mini squat on airex 2 x10     Fwd lunge BOSU x15 ea LE   Lateral lunge x10 each LE N  Cone taps x3 cones, x10 ea LE (X)  Seated LAQ's 2 x10 B, 4#   Bridges 2 x10    Supine SLR's B 2 x10 X    OP EDUCATION:   Access Code: [email or web address]  URL: [email or web address]  Date: 2024  Prepared by: Yoanna" Milena    Exercises  - Tandem Stance  - 1 x daily - 7 x weekly - 1-2 sets - 10 reps  - Standing Narrow Base of Support  - 1 x daily - 7 x weekly - 1-2 sets - 10 reps  - Standing Single Leg Stance with Counter Support  - 1 x daily - 7 x weekly - 1-2 sets - 10 reps   Access Code: [email or web address]  URL: [email or web address]  Date: 03/21/2024  Prepared by: Brittni León      Access Code: [email or web address]  URL: [email or web address]  Date: 03/19/2024  Prepared by: Yoanna bAbott       Goals:  Active       PT Problem       PT Goals       Start:  03/21/24    Expected End:  04/19/24       TUG score improvement by 4 seconds to demonstrate improved dynamic stability with ambulation to reduce risk of falls-Week 4-MET  Increase in ABC scale score by 10% to demonstrate improved static and dynamic stability with ADL/iADL completion-Week 4-MET  Decrease in 5xSTS score by 5 seconds to demonstrate improved functional strength of B LE for reducing fall risk at home and community-Week 4-MET  Increase in B LE musculature strength 5/5 MMT to improve stability in standing/ambulation to reduce risk of falls-Week 4-MET  Patient will demonstrate compliance in their home exercise program in order to promote independence in self management of functional mobility.-Week 2-MET

## 2024-04-12 ENCOUNTER — LAB (OUTPATIENT)
Dept: LAB | Facility: LAB | Age: 74
End: 2024-04-12
Payer: MEDICARE

## 2024-04-12 DIAGNOSIS — N18.31 STAGE 3A CHRONIC KIDNEY DISEASE (MULTI): ICD-10-CM

## 2024-04-12 LAB
ANION GAP SERPL CALC-SCNC: 13 MMOL/L (ref 10–20)
APPEARANCE UR: CLEAR
BILIRUB UR STRIP.AUTO-MCNC: NEGATIVE MG/DL
BUN SERPL-MCNC: 22 MG/DL (ref 6–23)
CALCIUM SERPL-MCNC: 9.8 MG/DL (ref 8.6–10.3)
CHLORIDE SERPL-SCNC: 105 MMOL/L (ref 98–107)
CO2 SERPL-SCNC: 29 MMOL/L (ref 21–32)
COLOR UR: YELLOW
CREAT SERPL-MCNC: 1.39 MG/DL (ref 0.5–1.3)
CREAT UR-MCNC: <1 MG/DL (ref 20–370)
EGFRCR SERPLBLD CKD-EPI 2021: 53 ML/MIN/1.73M*2
ERYTHROCYTE [DISTWIDTH] IN BLOOD BY AUTOMATED COUNT: 12.5 % (ref 11.5–14.5)
GLUCOSE SERPL-MCNC: 142 MG/DL (ref 74–99)
GLUCOSE UR STRIP.AUTO-MCNC: ABNORMAL MG/DL
HCT VFR BLD AUTO: 39.5 % (ref 41–52)
HGB BLD-MCNC: 12.9 G/DL (ref 13.5–17.5)
KETONES UR STRIP.AUTO-MCNC: NEGATIVE MG/DL
LEUKOCYTE ESTERASE UR QL STRIP.AUTO: NEGATIVE
MCH RBC QN AUTO: 32.8 PG (ref 26–34)
MCHC RBC AUTO-ENTMCNC: 32.7 G/DL (ref 32–36)
MCV RBC AUTO: 101 FL (ref 80–100)
MICROALBUMIN UR-MCNC: <7 MG/L
MICROALBUMIN/CREAT UR: ABNORMAL MG/G{CREAT}
NITRITE UR QL STRIP.AUTO: NEGATIVE
NRBC BLD-RTO: 0 /100 WBCS (ref 0–0)
PH UR STRIP.AUTO: 5 [PH]
PLATELET # BLD AUTO: 161 X10*3/UL (ref 150–450)
POTASSIUM SERPL-SCNC: 4.6 MMOL/L (ref 3.5–5.3)
PROT UR STRIP.AUTO-MCNC: NEGATIVE MG/DL
RBC # BLD AUTO: 3.93 X10*6/UL (ref 4.5–5.9)
RBC # UR STRIP.AUTO: NEGATIVE /UL
SODIUM SERPL-SCNC: 142 MMOL/L (ref 136–145)
SP GR UR STRIP.AUTO: 1.02
UROBILINOGEN UR STRIP.AUTO-MCNC: <2 MG/DL
WBC # BLD AUTO: 6.2 X10*3/UL (ref 4.4–11.3)

## 2024-04-12 PROCEDURE — 80048 BASIC METABOLIC PNL TOTAL CA: CPT

## 2024-04-12 PROCEDURE — 81003 URINALYSIS AUTO W/O SCOPE: CPT

## 2024-04-12 PROCEDURE — 82570 ASSAY OF URINE CREATININE: CPT

## 2024-04-12 PROCEDURE — 85027 COMPLETE CBC AUTOMATED: CPT

## 2024-04-12 PROCEDURE — 36415 COLL VENOUS BLD VENIPUNCTURE: CPT

## 2024-04-12 PROCEDURE — 82043 UR ALBUMIN QUANTITATIVE: CPT

## 2024-04-15 ENCOUNTER — OFFICE VISIT (OUTPATIENT)
Dept: NEPHROLOGY | Facility: CLINIC | Age: 74
End: 2024-04-15
Payer: MEDICARE

## 2024-04-15 VITALS
HEIGHT: 70 IN | DIASTOLIC BLOOD PRESSURE: 64 MMHG | WEIGHT: 184 LBS | HEART RATE: 74 BPM | BODY MASS INDEX: 26.34 KG/M2 | SYSTOLIC BLOOD PRESSURE: 114 MMHG

## 2024-04-15 DIAGNOSIS — D63.1 ANEMIA DUE TO STAGE 3A CHRONIC KIDNEY DISEASE (MULTI): ICD-10-CM

## 2024-04-15 DIAGNOSIS — N18.31 ANEMIA DUE TO STAGE 3A CHRONIC KIDNEY DISEASE (MULTI): ICD-10-CM

## 2024-04-15 DIAGNOSIS — E11.9 TYPE 2 DIABETES MELLITUS WITHOUT COMPLICATION, WITHOUT LONG-TERM CURRENT USE OF INSULIN (MULTI): ICD-10-CM

## 2024-04-15 DIAGNOSIS — N18.31 STAGE 3A CHRONIC KIDNEY DISEASE (MULTI): Primary | ICD-10-CM

## 2024-04-15 PROCEDURE — 1160F RVW MEDS BY RX/DR IN RCRD: CPT | Performed by: CLINICAL NURSE SPECIALIST

## 2024-04-15 PROCEDURE — 3062F POS MACROALBUMINURIA REV: CPT | Performed by: CLINICAL NURSE SPECIALIST

## 2024-04-15 PROCEDURE — 99213 OFFICE O/P EST LOW 20 MIN: CPT | Performed by: CLINICAL NURSE SPECIALIST

## 2024-04-15 PROCEDURE — 3078F DIAST BP <80 MM HG: CPT | Performed by: CLINICAL NURSE SPECIALIST

## 2024-04-15 PROCEDURE — 1159F MED LIST DOCD IN RCRD: CPT | Performed by: CLINICAL NURSE SPECIALIST

## 2024-04-15 PROCEDURE — 1036F TOBACCO NON-USER: CPT | Performed by: CLINICAL NURSE SPECIALIST

## 2024-04-15 PROCEDURE — 4010F ACE/ARB THERAPY RXD/TAKEN: CPT | Performed by: CLINICAL NURSE SPECIALIST

## 2024-04-15 PROCEDURE — 3074F SYST BP LT 130 MM HG: CPT | Performed by: CLINICAL NURSE SPECIALIST

## 2024-04-15 RX ORDER — INSULIN PUMP SYRINGE, 3 ML
1 EACH MISCELLANEOUS DAILY
Qty: 1 EACH | Refills: 0 | Status: SHIPPED | OUTPATIENT
Start: 2024-04-15 | End: 2024-04-15 | Stop reason: SDUPTHER

## 2024-04-15 RX ORDER — INSULIN PUMP SYRINGE, 3 ML
1 EACH MISCELLANEOUS DAILY
Qty: 1 EACH | Refills: 0 | Status: SHIPPED | OUTPATIENT
Start: 2024-04-15 | End: 2025-04-15

## 2024-04-15 ASSESSMENT — ENCOUNTER SYMPTOMS
ENDOCRINE NEGATIVE: 1
CARDIOVASCULAR NEGATIVE: 1
CONSTITUTIONAL NEGATIVE: 1
PSYCHIATRIC NEGATIVE: 1
NEUROLOGICAL NEGATIVE: 1
MUSCULOSKELETAL NEGATIVE: 1
RESPIRATORY NEGATIVE: 1
GASTROINTESTINAL NEGATIVE: 1

## 2024-04-15 NOTE — PROGRESS NOTES
Subjective   Patient ID: Pato Christiansen is a 74 y.o. male who presents for Follow-up (6 month ck/Review labs 4/12).  Patient being seen in follow-up for chronic kidney disease stage III with history of diabetes mellitus and hypertension    Labs reviewed  Albumin creatinine ratio with albumin less than 7  Urinalysis with glucose greater than 500 on SGLT2  H&H 12.9 and 39.5  BMP with glucose 142  Sodium 142, potassium 4.6, chloride 105, bicarb 29  Renal function with a BUN of 22 and creatinine of 1.39    He is doing fairly well  He did have some weakness and 2 falls at that time she discontinued his Farxiga thinking it was related to this however it has been restarted and he is getting physical therapy at this time  He is voiding without difficulties  He has no swelling          Review of Systems   Constitutional: Negative.    Respiratory: Negative.     Cardiovascular: Negative.    Gastrointestinal: Negative.    Endocrine: Negative.    Genitourinary: Negative.    Musculoskeletal: Negative.    Skin: Negative.    Neurological: Negative.    Psychiatric/Behavioral: Negative.         Objective   Physical Exam  Vitals reviewed.   Constitutional:       Appearance: Normal appearance.   HENT:      Head: Normocephalic.   Cardiovascular:      Rate and Rhythm: Normal rate and regular rhythm.   Pulmonary:      Effort: Pulmonary effort is normal.      Breath sounds: Normal breath sounds.   Abdominal:      Palpations: Abdomen is soft.   Musculoskeletal:         General: Normal range of motion.   Skin:     General: Skin is warm and dry.   Neurological:      Mental Status: He is alert and oriented to person, place, and time.   Psychiatric:         Mood and Affect: Mood normal.         Behavior: Behavior normal.         Assessment/Plan   Problem List Items Addressed This Visit             ICD-10-CM    Anemia due to chronic kidney disease N18.9, D63.1    Type 2 diabetes mellitus without complication, without long-term current use of  insulin (Multi) E11.9     Blood sugars have been well-controlled with last hemoglobin A1c of 7.1, on Farxiga and Januvia         Relevant Medications    Blood glucose monitoring meter kit kit    Chronic kidney disease - Primary N18.9    Relevant Orders    Follow Up In Nephrology    Basic metabolic panel     Chronic kidney disease stage IIIa with baseline creatinine 1.2-1.5  Diabetes mellitus type 2  Hypertension  Acid reflux  Anemia  Left renal cyst  Obstructive sleep apnea        JELANI Suarez-MARCO A, DNP 04/15/24 10:20 AM

## 2024-04-17 RX ORDER — BLOOD SUGAR DIAGNOSTIC
1 STRIP MISCELLANEOUS DAILY
Qty: 50 STRIP | Refills: 3 | Status: SHIPPED | OUTPATIENT
Start: 2024-04-17

## 2024-05-13 ENCOUNTER — APPOINTMENT (OUTPATIENT)
Dept: URBAN - METROPOLITAN AREA CLINIC 204 | Age: 74
Setting detail: DERMATOLOGY
End: 2024-05-13

## 2024-05-13 ENCOUNTER — TELEPHONE (OUTPATIENT)
Dept: PRIMARY CARE | Facility: CLINIC | Age: 74
End: 2024-05-13
Payer: MEDICARE

## 2024-05-13 DIAGNOSIS — Z85.828 PERSONAL HISTORY OF OTHER MALIGNANT NEOPLASM OF SKIN: ICD-10-CM

## 2024-05-13 DIAGNOSIS — L57.8 OTHER SKIN CHANGES DUE TO CHRONIC EXPOSURE TO NONIONIZING RADIATION: ICD-10-CM

## 2024-05-13 DIAGNOSIS — L21.8 OTHER SEBORRHEIC DERMATITIS: ICD-10-CM

## 2024-05-13 DIAGNOSIS — I10 HYPERTENSION, UNSPECIFIED TYPE: ICD-10-CM

## 2024-05-13 PROCEDURE — 99203 OFFICE O/P NEW LOW 30 MIN: CPT

## 2024-05-13 PROCEDURE — OTHER COUNSELING: OTHER

## 2024-05-13 PROCEDURE — OTHER MIPS QUALITY: OTHER

## 2024-05-13 RX ORDER — LOSARTAN POTASSIUM 25 MG/1
25 TABLET ORAL DAILY
Qty: 90 TABLET | Refills: 3 | Status: SHIPPED | OUTPATIENT
Start: 2024-05-13 | End: 2025-05-13

## 2024-05-13 ASSESSMENT — LOCATION SIMPLE DESCRIPTION DERM
LOCATION SIMPLE: RIGHT NOSE
LOCATION SIMPLE: LEFT FOREHEAD
LOCATION SIMPLE: RIGHT SCALP
LOCATION SIMPLE: LEFT CHEEK

## 2024-05-13 ASSESSMENT — LOCATION DETAILED DESCRIPTION DERM
LOCATION DETAILED: LEFT SUPERIOR MEDIAL FOREHEAD
LOCATION DETAILED: RIGHT NASAL ALA
LOCATION DETAILED: LEFT MEDIAL MALAR CHEEK
LOCATION DETAILED: RIGHT MEDIAL FRONTAL SCALP

## 2024-05-13 ASSESSMENT — LOCATION ZONE DERM
LOCATION ZONE: NOSE
LOCATION ZONE: SCALP
LOCATION ZONE: FACE

## 2024-05-13 NOTE — PROCEDURE: COUNSELING
Shampoo Recommendations: Alternate T/Gel with T/Sal to help soothe the scalp and gently lift the scale away.
Detail Level: Detailed
Detail Level: Generalized

## 2024-05-13 NOTE — TELEPHONE ENCOUNTER
Patient wife called in and is requesting a refill on the following medication....  losartan (Cozaar) 25 mg tablet     Called into [facility] pharmacy

## 2024-05-22 ENCOUNTER — LAB (OUTPATIENT)
Dept: LAB | Facility: LAB | Age: 74
End: 2024-05-22
Payer: MEDICARE

## 2024-05-22 DIAGNOSIS — E11.9 TYPE 2 DIABETES MELLITUS WITHOUT COMPLICATION, WITHOUT LONG-TERM CURRENT USE OF INSULIN (MULTI): ICD-10-CM

## 2024-05-22 LAB
ALBUMIN SERPL BCP-MCNC: 4.4 G/DL (ref 3.4–5)
ALP SERPL-CCNC: 48 U/L (ref 33–136)
ALT SERPL W P-5'-P-CCNC: 15 U/L (ref 10–52)
ANION GAP SERPL CALC-SCNC: 11 MMOL/L (ref 10–20)
AST SERPL W P-5'-P-CCNC: 17 U/L (ref 9–39)
BILIRUB SERPL-MCNC: 0.7 MG/DL (ref 0–1.2)
BUN SERPL-MCNC: 23 MG/DL (ref 6–23)
CALCIUM SERPL-MCNC: 9.5 MG/DL (ref 8.6–10.3)
CHLORIDE SERPL-SCNC: 106 MMOL/L (ref 98–107)
CHOLEST SERPL-MCNC: 139 MG/DL (ref 0–199)
CHOLESTEROL/HDL RATIO: 2.8
CO2 SERPL-SCNC: 29 MMOL/L (ref 21–32)
CREAT SERPL-MCNC: 1.47 MG/DL (ref 0.5–1.3)
EGFRCR SERPLBLD CKD-EPI 2021: 49 ML/MIN/1.73M*2
ERYTHROCYTE [DISTWIDTH] IN BLOOD BY AUTOMATED COUNT: 12.1 % (ref 11.5–14.5)
EST. AVERAGE GLUCOSE BLD GHB EST-MCNC: 166 MG/DL
GLUCOSE SERPL-MCNC: 159 MG/DL (ref 74–99)
HBA1C MFR BLD: 7.4 %
HCT VFR BLD AUTO: 38.4 % (ref 41–52)
HDLC SERPL-MCNC: 49 MG/DL
HGB BLD-MCNC: 12.5 G/DL (ref 13.5–17.5)
LDLC SERPL CALC-MCNC: 73 MG/DL
MCH RBC QN AUTO: 32.7 PG (ref 26–34)
MCHC RBC AUTO-ENTMCNC: 32.6 G/DL (ref 32–36)
MCV RBC AUTO: 101 FL (ref 80–100)
NON HDL CHOLESTEROL: 90 MG/DL (ref 0–149)
NRBC BLD-RTO: 0 /100 WBCS (ref 0–0)
PLATELET # BLD AUTO: 137 X10*3/UL (ref 150–450)
POTASSIUM SERPL-SCNC: 4.6 MMOL/L (ref 3.5–5.3)
PROT SERPL-MCNC: 6.3 G/DL (ref 6.4–8.2)
RBC # BLD AUTO: 3.82 X10*6/UL (ref 4.5–5.9)
SODIUM SERPL-SCNC: 141 MMOL/L (ref 136–145)
TRIGL SERPL-MCNC: 84 MG/DL (ref 0–149)
VLDL: 17 MG/DL (ref 0–40)
WBC # BLD AUTO: 5.5 X10*3/UL (ref 4.4–11.3)

## 2024-05-22 PROCEDURE — 36415 COLL VENOUS BLD VENIPUNCTURE: CPT

## 2024-05-22 PROCEDURE — 80061 LIPID PANEL: CPT

## 2024-05-22 PROCEDURE — 83036 HEMOGLOBIN GLYCOSYLATED A1C: CPT

## 2024-05-22 PROCEDURE — 85027 COMPLETE CBC AUTOMATED: CPT

## 2024-05-22 PROCEDURE — 80053 COMPREHEN METABOLIC PANEL: CPT

## 2024-06-07 ENCOUNTER — OFFICE VISIT (OUTPATIENT)
Dept: PRIMARY CARE | Facility: CLINIC | Age: 74
End: 2024-06-07
Payer: MEDICARE

## 2024-06-07 VITALS
DIASTOLIC BLOOD PRESSURE: 68 MMHG | HEART RATE: 60 BPM | HEIGHT: 70 IN | BODY MASS INDEX: 25.77 KG/M2 | WEIGHT: 180 LBS | SYSTOLIC BLOOD PRESSURE: 108 MMHG

## 2024-06-07 DIAGNOSIS — E53.8 VITAMIN B12 DEFICIENCY: ICD-10-CM

## 2024-06-07 DIAGNOSIS — N18.31 STAGE 3A CHRONIC KIDNEY DISEASE (MULTI): ICD-10-CM

## 2024-06-07 DIAGNOSIS — E11.9 TYPE 2 DIABETES MELLITUS WITHOUT COMPLICATION, WITHOUT LONG-TERM CURRENT USE OF INSULIN (MULTI): ICD-10-CM

## 2024-06-07 DIAGNOSIS — F03.A0 MILD DEMENTIA, UNSPECIFIED DEMENTIA TYPE, UNSPECIFIED WHETHER BEHAVIORAL, PSYCHOTIC, OR MOOD DISTURBANCE OR ANXIETY (MULTI): ICD-10-CM

## 2024-06-07 DIAGNOSIS — G47.33 OSA (OBSTRUCTIVE SLEEP APNEA): ICD-10-CM

## 2024-06-07 DIAGNOSIS — E55.9 VITAMIN D DEFICIENCY: ICD-10-CM

## 2024-06-07 DIAGNOSIS — Z12.5 PROSTATE CANCER SCREENING: ICD-10-CM

## 2024-06-07 DIAGNOSIS — K21.9 GASTROESOPHAGEAL REFLUX DISEASE WITHOUT ESOPHAGITIS: ICD-10-CM

## 2024-06-07 DIAGNOSIS — Z00.00 ROUTINE GENERAL MEDICAL EXAMINATION AT HEALTH CARE FACILITY: Primary | ICD-10-CM

## 2024-06-07 PROBLEM — E11.21: Status: RESOLVED | Noted: 2023-09-18 | Resolved: 2024-06-07

## 2024-06-07 PROCEDURE — 3051F HG A1C>EQUAL 7.0%<8.0%: CPT

## 2024-06-07 PROCEDURE — 1170F FXNL STATUS ASSESSED: CPT

## 2024-06-07 PROCEDURE — 4010F ACE/ARB THERAPY RXD/TAKEN: CPT

## 2024-06-07 PROCEDURE — 3048F LDL-C <100 MG/DL: CPT

## 2024-06-07 PROCEDURE — 1036F TOBACCO NON-USER: CPT

## 2024-06-07 PROCEDURE — 3078F DIAST BP <80 MM HG: CPT

## 2024-06-07 PROCEDURE — G0439 PPPS, SUBSEQ VISIT: HCPCS

## 2024-06-07 PROCEDURE — 99214 OFFICE O/P EST MOD 30 MIN: CPT

## 2024-06-07 PROCEDURE — 3074F SYST BP LT 130 MM HG: CPT

## 2024-06-07 PROCEDURE — 3062F POS MACROALBUMINURIA REV: CPT

## 2024-06-07 PROCEDURE — 1124F ACP DISCUSS-NO DSCNMKR DOCD: CPT

## 2024-06-07 PROCEDURE — 1159F MED LIST DOCD IN RCRD: CPT

## 2024-06-07 RX ORDER — METFORMIN HYDROCHLORIDE 1000 MG/1
1000 TABLET ORAL
Qty: 180 TABLET | Refills: 3 | Status: SHIPPED | OUTPATIENT
Start: 2024-06-07 | End: 2025-06-07

## 2024-06-07 RX ORDER — PRAVASTATIN SODIUM 20 MG/1
20 TABLET ORAL DAILY
Qty: 90 TABLET | Refills: 3 | Status: SHIPPED | OUTPATIENT
Start: 2024-06-07 | End: 2025-06-07

## 2024-06-07 ASSESSMENT — ENCOUNTER SYMPTOMS
CARDIOVASCULAR NEGATIVE: 1
CONSTITUTIONAL NEGATIVE: 1
GASTROINTESTINAL NEGATIVE: 1
RESPIRATORY NEGATIVE: 1

## 2024-06-07 ASSESSMENT — ACTIVITIES OF DAILY LIVING (ADL)
GROCERY_SHOPPING: INDEPENDENT
MANAGING_FINANCES: NEEDS ASSISTANCE
TAKING_MEDICATION: INDEPENDENT
DOING_HOUSEWORK: INDEPENDENT
BATHING: INDEPENDENT
DRESSING: INDEPENDENT

## 2024-06-07 ASSESSMENT — PATIENT HEALTH QUESTIONNAIRE - PHQ9
SUM OF ALL RESPONSES TO PHQ9 QUESTIONS 1 AND 2: 0
2. FEELING DOWN, DEPRESSED OR HOPELESS: NOT AT ALL
1. LITTLE INTEREST OR PLEASURE IN DOING THINGS: NOT AT ALL

## 2024-06-07 NOTE — PROGRESS NOTES
"Subjective   Patient ID: Pato Christiansen is a 74 y.o. male who presents for Medicare Annual Wellness Visit Subsequent (6 month med check pt strained low back 4 days ago ).    HPI     Review of Systems    Objective   /68   Pulse 60   Ht 1.778 m (5' 10\")   Wt 81.6 kg (180 lb)   BMI 25.83 kg/m²     Physical Exam    Assessment/Plan          "

## 2024-06-07 NOTE — PROGRESS NOTES
Subjective   Reason for Visit: Pato Christiansen is an 74 y.o. male here for a Medicare Wellness visit.     Past Medical, Surgical, and Family History reviewed and updated in chart.    Reviewed all medications by prescribing practitioner or clinical pharmacist (such as prescriptions, OTCs, herbal therapies and supplements) and documented in the medical record.    HPI  HYPERTENSION: BP good in office today 108/68. Compliant with regimen, endorses BP <130/80 at home. Denies CP/SOB/visual changes/dizziness.  DIABETES MELLITUS TYPE 2: Home BS's 100-140. Last A1C 7.4. Compliant with medication, no SE.  CKD: Still seeing Dr. Laird. GFR stable.  GERD: Stopped famotidine, has had no issues.  ANEMIA/FATIGUE/THROMBOCYTOPENIA: Was seeing Dr. Smith, but now Dr. Laird watches this. Hgb stable.  HISTORY OF COLON POLYPS: Colonoscopy 1/13/2020. No polyps. Was told no further colonoscopies needed.  SLEEP APNEA: Still using his CPap. Doing well with it. More prn now.  DIASTASIS RECTI: Unchanged.  URINARY FREQUENCY: Unchanged. 8 times daily, once nightly. This most likely d/t Farxiga. Would not like urology referral.  MEMORY DIFFICULTY: Sees Dr. Kate February annually. Had MRI brain in [place] 6/18/21 which was unremarkable. Has been taking Aricept, no SE. Pretty much back to baseline.  LEFT SHOULDER PAIN: L shoulder surgery 2022. Had PT, follows with Dr. Blount and will get occasional injection.  LOW B12: Taking 500mcg daily.  LOW VIT D: Taking 5000IU daily.  CT Cardiac score 207/moderate risk 1/25/22.     Endorses tweaked his low back about 5 days ago, icing/Tylenol, starting to improve.    UTD flu vaccines  UTD pneumonia vaccines  Got COVID Vaccines and booster   Shingles vaccinated    Patient Care Team:  Silvio Singh PA-C as PCP - General  Rustam Mackenzie MD as PCP - [employer] PCP     Review of Systems   Constitutional: Negative.    Respiratory: Negative.     Cardiovascular: Negative.    Gastrointestinal: Negative.   "      Objective   Vitals:  /68   Pulse 60   Ht 1.778 m (5' 10\")   Wt 81.6 kg (180 lb)   BMI 25.83 kg/m²       Physical Exam  Constitutional:       General: He is not in acute distress.     Appearance: Normal appearance. He is not ill-appearing.   HENT:      Head: Normocephalic and atraumatic.   Eyes:      Extraocular Movements: Extraocular movements intact.      Conjunctiva/sclera: Conjunctivae normal.   Cardiovascular:      Rate and Rhythm: Normal rate.   Pulmonary:      Effort: Pulmonary effort is normal.   Abdominal:      General: There is no distension.   Musculoskeletal:         General: Normal range of motion.      Cervical back: Normal range of motion.   Skin:     General: Skin is warm and dry.   Neurological:      General: No focal deficit present.      Mental Status: He is alert and oriented to person, place, and time.   Psychiatric:         Mood and Affect: Mood normal.         Behavior: Behavior normal.         Thought Content: Thought content normal.         Judgment: Judgment normal.         Assessment/Plan   Problem List Items Addressed This Visit       Type 2 diabetes mellitus without complication, without long-term current use of insulin (Multi)        Chronic conditions stable.  Advised let me know if needing referral to PT for back.  Labs reviewed and stable.  No medication changes today.  Follow up 6 months with labs prior.  "

## 2024-10-10 ENCOUNTER — LAB (OUTPATIENT)
Dept: LAB | Facility: LAB | Age: 74
End: 2024-10-10
Payer: MEDICARE

## 2024-10-10 DIAGNOSIS — N18.31 STAGE 3A CHRONIC KIDNEY DISEASE (MULTI): ICD-10-CM

## 2024-10-10 LAB
ANION GAP SERPL CALC-SCNC: 12 MMOL/L (ref 10–20)
BUN SERPL-MCNC: 18 MG/DL (ref 6–23)
CALCIUM SERPL-MCNC: 10 MG/DL (ref 8.6–10.3)
CHLORIDE SERPL-SCNC: 104 MMOL/L (ref 98–107)
CO2 SERPL-SCNC: 30 MMOL/L (ref 21–32)
CREAT SERPL-MCNC: 1.39 MG/DL (ref 0.5–1.3)
EGFRCR SERPLBLD CKD-EPI 2021: 53 ML/MIN/1.73M*2
GLUCOSE SERPL-MCNC: 144 MG/DL (ref 74–99)
POTASSIUM SERPL-SCNC: 4.4 MMOL/L (ref 3.5–5.3)
SODIUM SERPL-SCNC: 142 MMOL/L (ref 136–145)

## 2024-10-10 PROCEDURE — 36415 COLL VENOUS BLD VENIPUNCTURE: CPT

## 2024-10-10 PROCEDURE — 80048 BASIC METABOLIC PNL TOTAL CA: CPT

## 2024-10-16 ENCOUNTER — APPOINTMENT (OUTPATIENT)
Dept: NEPHROLOGY | Facility: CLINIC | Age: 74
End: 2024-10-16
Payer: MEDICARE

## 2024-10-16 VITALS
HEIGHT: 70 IN | HEART RATE: 54 BPM | DIASTOLIC BLOOD PRESSURE: 72 MMHG | BODY MASS INDEX: 26.24 KG/M2 | WEIGHT: 183.3 LBS | SYSTOLIC BLOOD PRESSURE: 126 MMHG

## 2024-10-16 DIAGNOSIS — N18.31 STAGE 3A CHRONIC KIDNEY DISEASE (MULTI): Primary | ICD-10-CM

## 2024-10-16 DIAGNOSIS — E55.9 VITAMIN D DEFICIENCY: ICD-10-CM

## 2024-10-16 DIAGNOSIS — E11.69 TYPE 2 DIABETES MELLITUS WITH OTHER SPECIFIED COMPLICATION, UNSPECIFIED WHETHER LONG TERM INSULIN USE (MULTI): ICD-10-CM

## 2024-10-16 DIAGNOSIS — E11.9 TYPE 2 DIABETES MELLITUS WITHOUT COMPLICATION, WITHOUT LONG-TERM CURRENT USE OF INSULIN (MULTI): ICD-10-CM

## 2024-10-16 DIAGNOSIS — I10 PRIMARY HYPERTENSION: ICD-10-CM

## 2024-10-16 PROCEDURE — 3062F POS MACROALBUMINURIA REV: CPT | Performed by: CLINICAL NURSE SPECIALIST

## 2024-10-16 PROCEDURE — 1159F MED LIST DOCD IN RCRD: CPT | Performed by: CLINICAL NURSE SPECIALIST

## 2024-10-16 PROCEDURE — 1160F RVW MEDS BY RX/DR IN RCRD: CPT | Performed by: CLINICAL NURSE SPECIALIST

## 2024-10-16 PROCEDURE — 3074F SYST BP LT 130 MM HG: CPT | Performed by: CLINICAL NURSE SPECIALIST

## 2024-10-16 PROCEDURE — 3051F HG A1C>EQUAL 7.0%<8.0%: CPT | Performed by: CLINICAL NURSE SPECIALIST

## 2024-10-16 PROCEDURE — 99213 OFFICE O/P EST LOW 20 MIN: CPT | Performed by: CLINICAL NURSE SPECIALIST

## 2024-10-16 PROCEDURE — 1036F TOBACCO NON-USER: CPT | Performed by: CLINICAL NURSE SPECIALIST

## 2024-10-16 PROCEDURE — 3078F DIAST BP <80 MM HG: CPT | Performed by: CLINICAL NURSE SPECIALIST

## 2024-10-16 PROCEDURE — 3048F LDL-C <100 MG/DL: CPT | Performed by: CLINICAL NURSE SPECIALIST

## 2024-10-16 PROCEDURE — 4010F ACE/ARB THERAPY RXD/TAKEN: CPT | Performed by: CLINICAL NURSE SPECIALIST

## 2024-10-16 ASSESSMENT — ENCOUNTER SYMPTOMS
NEUROLOGICAL NEGATIVE: 1
RESPIRATORY NEGATIVE: 1
CONSTITUTIONAL NEGATIVE: 1
GASTROINTESTINAL NEGATIVE: 1
PSYCHIATRIC NEGATIVE: 1
MUSCULOSKELETAL NEGATIVE: 1
CARDIOVASCULAR NEGATIVE: 1
ENDOCRINE NEGATIVE: 1

## 2024-10-16 NOTE — ASSESSMENT & PLAN NOTE
Renal function is stable with creatinine of 1.39, GFR is 53, blood pressure is well-controlled, does have some hyperglycemia but does enjoy to have sweets, hemoglobin A1c 7.4, on SGLT2, not using nephrotoxic medications

## 2024-10-16 NOTE — PROGRESS NOTES
Subjective   Patient ID: Pato Christiansen is a 74 y.o. male who presents for Follow-up (6 months /Review labs).      Patient being seen in follow-up for chronic kidney disease stage IIIa with history of hypertension and diabetes    Labs reviewed  Glucose 144  Sodium 142, potassium 4.4, chloride 104, bicarb 30  Renal functions BUN of 18 and creatinine of 1.39, GFR is 53  Calcium 10.0  Last hemoglobin A1c 7.4 in May    He is doing fairly well  His wife is with him today as he does have some memory issues  They have recently moved  Her biggest concern is he has a sweet tooth and loves to have cookies  He is voiding without difficulties  He denies any lightheadedness or dizziness  He does answer questions appropriately in the office however he also sometimes bag with his replies        Review of Systems   Constitutional: Negative.    Respiratory: Negative.     Cardiovascular: Negative.    Gastrointestinal: Negative.    Endocrine: Negative.    Genitourinary: Negative.    Musculoskeletal: Negative.    Skin: Negative.    Neurological: Negative.    Psychiatric/Behavioral: Negative.         Objective   Physical Exam  Vitals reviewed.   Constitutional:       Appearance: Normal appearance.   HENT:      Head: Normocephalic.   Cardiovascular:      Rate and Rhythm: Normal rate and regular rhythm.   Pulmonary:      Effort: Pulmonary effort is normal.      Breath sounds: Normal breath sounds.   Abdominal:      Palpations: Abdomen is soft.   Musculoskeletal:         General: Normal range of motion.   Skin:     General: Skin is warm and dry.   Neurological:      Mental Status: He is alert and oriented to person, place, and time.   Psychiatric:         Mood and Affect: Mood normal.         Behavior: Behavior normal.       Assessment/Plan   Problem List Items Addressed This Visit             ICD-10-CM    Type 2 diabetes mellitus without complication, without long-term current use of insulin (Multi) E11.9     Last hemoglobin A1c 7.4, on  metformin and Farxiga, will change to Jardiance with his next refill as he does have better coverage from his insurance on Jardiance         Hypertension I10     Pressure is well-controlled on losartan         Stage 3a chronic kidney disease (Multi) - Primary N18.31     Renal function is stable with creatinine of 1.39, GFR is 53, blood pressure is well-controlled, does have some hyperglycemia but does enjoy to have sweets, hemoglobin A1c 7.4, on SGLT2, not using nephrotoxic medications         Relevant Medications    empagliflozin (Jardiance) 25 mg    Other Relevant Orders    Basic metabolic panel    Urinalysis with Reflex Microscopic    Albumin-Creatinine Ratio, Urine Random    Follow Up In Nephrology    Vitamin D deficiency E55.9     Other Visit Diagnoses         Codes    Type 2 diabetes mellitus with other specified complication, unspecified whether long term insulin use (Multi)     E11.69    Relevant Medications    empagliflozin (Jardiance) 25 mg    Other Relevant Orders    Basic metabolic panel    Urinalysis with Reflex Microscopic    Albumin-Creatinine Ratio, Urine Random    Follow Up In Nephrology          Chronic kidney disease stage IIIa with baseline creatinine 1.2-1.5  Diabetes mellitus type 2  Hypertension  Acid reflux  Anemia  Left renal cyst  Obstructive sleep apnea        Suzie Laird, JELANI-MARCO A, DNP 10/16/24 10:23 AM

## 2024-10-16 NOTE — ASSESSMENT & PLAN NOTE
Last hemoglobin A1c 7.4, on metformin and Farxiga, will change to Jardiance with his next refill as he does have better coverage from his insurance on Jardiance

## 2024-12-10 ENCOUNTER — APPOINTMENT (OUTPATIENT)
Dept: PRIMARY CARE | Facility: CLINIC | Age: 74
End: 2024-12-10
Payer: MEDICARE

## 2024-12-12 ENCOUNTER — LAB (OUTPATIENT)
Dept: LAB | Facility: LAB | Age: 74
End: 2024-12-12
Payer: MEDICARE

## 2024-12-12 DIAGNOSIS — E55.9 VITAMIN D DEFICIENCY: ICD-10-CM

## 2024-12-12 DIAGNOSIS — E11.9 TYPE 2 DIABETES MELLITUS WITHOUT COMPLICATION, WITHOUT LONG-TERM CURRENT USE OF INSULIN (MULTI): ICD-10-CM

## 2024-12-12 DIAGNOSIS — Z12.5 PROSTATE CANCER SCREENING: ICD-10-CM

## 2024-12-12 DIAGNOSIS — E53.8 VITAMIN B12 DEFICIENCY: ICD-10-CM

## 2024-12-12 LAB
25(OH)D3 SERPL-MCNC: 63 NG/ML (ref 30–100)
ALBUMIN SERPL BCP-MCNC: 4.8 G/DL (ref 3.4–5)
ALP SERPL-CCNC: 61 U/L (ref 33–136)
ALT SERPL W P-5'-P-CCNC: 19 U/L (ref 10–52)
ANION GAP SERPL CALC-SCNC: 14 MMOL/L (ref 10–20)
AST SERPL W P-5'-P-CCNC: 21 U/L (ref 9–39)
BILIRUB SERPL-MCNC: 0.6 MG/DL (ref 0–1.2)
BUN SERPL-MCNC: 19 MG/DL (ref 6–23)
CALCIUM SERPL-MCNC: 9.9 MG/DL (ref 8.6–10.3)
CHLORIDE SERPL-SCNC: 105 MMOL/L (ref 98–107)
CO2 SERPL-SCNC: 27 MMOL/L (ref 21–32)
CREAT SERPL-MCNC: 1.54 MG/DL (ref 0.5–1.3)
EGFRCR SERPLBLD CKD-EPI 2021: 47 ML/MIN/1.73M*2
ERYTHROCYTE [DISTWIDTH] IN BLOOD BY AUTOMATED COUNT: 11.9 % (ref 11.5–14.5)
GLUCOSE SERPL-MCNC: 181 MG/DL (ref 74–99)
HCT VFR BLD AUTO: 40.9 % (ref 41–52)
HGB BLD-MCNC: 13.3 G/DL (ref 13.5–17.5)
MCH RBC QN AUTO: 32.4 PG (ref 26–34)
MCHC RBC AUTO-ENTMCNC: 32.5 G/DL (ref 32–36)
MCV RBC AUTO: 100 FL (ref 80–100)
NRBC BLD-RTO: 0 /100 WBCS (ref 0–0)
PLATELET # BLD AUTO: 145 X10*3/UL (ref 150–450)
POTASSIUM SERPL-SCNC: 4.6 MMOL/L (ref 3.5–5.3)
PROT SERPL-MCNC: 7.4 G/DL (ref 6.4–8.2)
PSA SERPL-MCNC: 0.44 NG/ML
RBC # BLD AUTO: 4.11 X10*6/UL (ref 4.5–5.9)
SODIUM SERPL-SCNC: 141 MMOL/L (ref 136–145)
VIT B12 SERPL-MCNC: 675 PG/ML (ref 211–911)
WBC # BLD AUTO: 7 X10*3/UL (ref 4.4–11.3)

## 2024-12-12 PROCEDURE — 36415 COLL VENOUS BLD VENIPUNCTURE: CPT

## 2024-12-12 PROCEDURE — G0103 PSA SCREENING: HCPCS

## 2024-12-12 PROCEDURE — 85027 COMPLETE CBC AUTOMATED: CPT

## 2024-12-12 PROCEDURE — 83036 HEMOGLOBIN GLYCOSYLATED A1C: CPT

## 2024-12-12 PROCEDURE — 82607 VITAMIN B-12: CPT

## 2024-12-12 PROCEDURE — 80053 COMPREHEN METABOLIC PANEL: CPT

## 2024-12-12 PROCEDURE — 82306 VITAMIN D 25 HYDROXY: CPT

## 2024-12-13 LAB
EST. AVERAGE GLUCOSE BLD GHB EST-MCNC: 174 MG/DL
HBA1C MFR BLD: 7.7 %

## 2024-12-23 DIAGNOSIS — E11.9 TYPE 2 DIABETES MELLITUS WITHOUT COMPLICATION, WITHOUT LONG-TERM CURRENT USE OF INSULIN (MULTI): ICD-10-CM

## 2025-01-06 ENCOUNTER — APPOINTMENT (OUTPATIENT)
Dept: PRIMARY CARE | Facility: CLINIC | Age: 75
End: 2025-01-06
Payer: MEDICARE

## 2025-01-06 VITALS
HEART RATE: 95 BPM | BODY MASS INDEX: 26.77 KG/M2 | WEIGHT: 187 LBS | SYSTOLIC BLOOD PRESSURE: 118 MMHG | HEIGHT: 70 IN | DIASTOLIC BLOOD PRESSURE: 78 MMHG | OXYGEN SATURATION: 97 %

## 2025-01-06 DIAGNOSIS — E11.9 TYPE 2 DIABETES MELLITUS WITHOUT COMPLICATION, WITHOUT LONG-TERM CURRENT USE OF INSULIN (MULTI): ICD-10-CM

## 2025-01-06 DIAGNOSIS — R29.898 WEAKNESS OF BOTH LOWER EXTREMITIES: Primary | ICD-10-CM

## 2025-01-06 DIAGNOSIS — I10 HYPERTENSION, UNSPECIFIED TYPE: ICD-10-CM

## 2025-01-06 DIAGNOSIS — Z12.5 SCREENING FOR PROSTATE CANCER: ICD-10-CM

## 2025-01-06 PROCEDURE — 1159F MED LIST DOCD IN RCRD: CPT

## 2025-01-06 PROCEDURE — 99214 OFFICE O/P EST MOD 30 MIN: CPT

## 2025-01-06 PROCEDURE — 1124F ACP DISCUSS-NO DSCNMKR DOCD: CPT

## 2025-01-06 PROCEDURE — 3074F SYST BP LT 130 MM HG: CPT

## 2025-01-06 PROCEDURE — 3078F DIAST BP <80 MM HG: CPT

## 2025-01-06 PROCEDURE — 1036F TOBACCO NON-USER: CPT

## 2025-01-06 PROCEDURE — 4010F ACE/ARB THERAPY RXD/TAKEN: CPT

## 2025-01-06 RX ORDER — LOSARTAN POTASSIUM 25 MG/1
25 TABLET ORAL DAILY
Qty: 90 TABLET | Refills: 3 | Status: SHIPPED | OUTPATIENT
Start: 2025-01-06 | End: 2026-01-06

## 2025-01-06 ASSESSMENT — PATIENT HEALTH QUESTIONNAIRE - PHQ9
1. LITTLE INTEREST OR PLEASURE IN DOING THINGS: NOT AT ALL
SUM OF ALL RESPONSES TO PHQ9 QUESTIONS 1 AND 2: 0
2. FEELING DOWN, DEPRESSED OR HOPELESS: NOT AT ALL

## 2025-01-06 ASSESSMENT — ENCOUNTER SYMPTOMS
CONSTITUTIONAL NEGATIVE: 1
CARDIOVASCULAR NEGATIVE: 1
GASTROINTESTINAL NEGATIVE: 1
RESPIRATORY NEGATIVE: 1

## 2025-01-06 NOTE — PROGRESS NOTES
"Subjective   Patient ID: Pato Christiansen is a 74 y.o. male who presents for pt here for 6 month med check  (Referral for PT for leg strength, balancing- [facility] ).    HPI   HYPERTENSION: BP good in office today 118/78. Compliant with regimen, endorses BP <130/80 at home. Denies CP/SOB/visual changes/dizziness.  DIABETES MELLITUS TYPE 2: Has not been checking at home. Last A1C 7.7. Compliant with medication, no SE.  CKD: Still seeing Dr. Laird. GFR stable.  GERD: Stopped famotidine, has had no issues.  ANEMIA/FATIGUE/THROMBOCYTOPENIA: Was seeing Dr. Smith, but now Dr. Laird watches this. Hgb stable.  HISTORY OF COLON POLYPS: Colonoscopy 1/13/2020. No polyps. Was told no further colonoscopies needed.  SLEEP APNEA: Still using his CPap. Doing well with it. More prn now.  DIASTASIS RECTI: Unchanged.  URINARY FREQUENCY: Unchanged. 8 times daily, once nightly. This most likely d/t Farxiga. Would not like urology referral.  MEMORY DIFFICULTY: Sees Dr. Kate February annually. Had MRI brain in [place] 6/18/21 which was unremarkable. Has been taking Aricept, no SE. Pretty much back to baseline.  LEFT SHOULDER PAIN: L shoulder surgery 2022. Had PT, follows with Dr. Blount and will get occasional injection.  LOW B12: Taking 500mcg daily.  LOW VIT D: Taking 5000IU daily.  CT Cardiac score 207/moderate risk 1/25/22.     Endorses legs have become weaker recently, he has been walking less and now their home has no stairs, balance and leg strength have worsened.    UTD flu vaccines  UTD pneumonia vaccines  Got COVID Vaccines and booster   Shingles vaccinated    Review of Systems   Constitutional: Negative.    Respiratory: Negative.     Cardiovascular: Negative.    Gastrointestinal: Negative.        Objective   /78   Pulse 95   Ht 1.778 m (5' 10\")   Wt 84.8 kg (187 lb)   SpO2 97%   BMI 26.83 kg/m²     Physical Exam  Constitutional:       General: He is not in acute distress.     Appearance: Normal appearance. He " is not ill-appearing.   HENT:      Head: Normocephalic and atraumatic.   Eyes:      Extraocular Movements: Extraocular movements intact.      Conjunctiva/sclera: Conjunctivae normal.   Cardiovascular:      Rate and Rhythm: Normal rate.   Pulmonary:      Effort: Pulmonary effort is normal.   Abdominal:      General: There is no distension.   Musculoskeletal:         General: Normal range of motion.      Cervical back: Normal range of motion.   Skin:     General: Skin is warm and dry.   Neurological:      General: No focal deficit present.      Mental Status: He is alert and oriented to person, place, and time.   Psychiatric:         Mood and Affect: Mood normal.         Behavior: Behavior normal.         Thought Content: Thought content normal.         Judgment: Judgment normal.         Assessment/Plan        Chronic conditions stable.  Labs reviewed and stable.  No medication changes today.  Referral to PT and thank you.  Follow up 6 months with fasting labs prior.

## 2025-02-17 DIAGNOSIS — E11.9 TYPE 2 DIABETES MELLITUS WITHOUT COMPLICATION, WITHOUT LONG-TERM CURRENT USE OF INSULIN (MULTI): ICD-10-CM

## 2025-02-17 RX ORDER — METFORMIN HYDROCHLORIDE 1000 MG/1
1000 TABLET ORAL
Qty: 20 TABLET | Refills: 0 | Status: SHIPPED | OUTPATIENT
Start: 2025-02-17 | End: 2025-02-27

## 2025-02-17 RX ORDER — METFORMIN HYDROCHLORIDE 1000 MG/1
1000 TABLET ORAL
COMMUNITY
End: 2025-02-17 | Stop reason: SDUPTHER

## 2025-02-17 NOTE — TELEPHONE ENCOUNTER
Pt wife is requesting a 10 day supply of metformin sent to [facility] while they wait for mail order -refill proposed

## 2025-04-10 LAB
ALBUMIN/CREAT UR: 13 MG/G CREAT
ANION GAP SERPL CALCULATED.4IONS-SCNC: 12 MMOL/L (CALC) (ref 7–17)
APPEARANCE UR: CLEAR
BILIRUB UR QL STRIP: NEGATIVE
BUN SERPL-MCNC: 20 MG/DL (ref 7–25)
BUN/CREAT SERPL: 13 (CALC) (ref 6–22)
CALCIUM SERPL-MCNC: 9.5 MG/DL (ref 8.6–10.3)
CHLORIDE SERPL-SCNC: 103 MMOL/L (ref 98–110)
CO2 SERPL-SCNC: 25 MMOL/L (ref 20–32)
COLOR UR: YELLOW
CREAT SERPL-MCNC: 1.58 MG/DL (ref 0.7–1.28)
CREAT UR-MCNC: 79 MG/DL (ref 20–320)
EGFRCR SERPLBLD CKD-EPI 2021: 45 ML/MIN/1.73M2
GLUCOSE SERPL-MCNC: 207 MG/DL (ref 65–99)
GLUCOSE UR QL STRIP: ABNORMAL
HGB UR QL STRIP: NEGATIVE
KETONES UR QL STRIP: NEGATIVE
LEUKOCYTE ESTERASE UR QL STRIP: NEGATIVE
MICROALBUMIN UR-MCNC: 1 MG/DL
NITRITE UR QL STRIP: NEGATIVE
PH UR STRIP: ABNORMAL [PH] (ref 5–8)
POTASSIUM SERPL-SCNC: 4.4 MMOL/L (ref 3.5–5.3)
PROT UR QL STRIP: NEGATIVE
SODIUM SERPL-SCNC: 140 MMOL/L (ref 135–146)
SP GR UR STRIP: 1.04 (ref 1–1.03)

## 2025-04-14 ENCOUNTER — APPOINTMENT (OUTPATIENT)
Dept: NEPHROLOGY | Facility: CLINIC | Age: 75
End: 2025-04-14

## 2025-04-14 VITALS
DIASTOLIC BLOOD PRESSURE: 64 MMHG | BODY MASS INDEX: 27.6 KG/M2 | WEIGHT: 192.8 LBS | HEART RATE: 65 BPM | SYSTOLIC BLOOD PRESSURE: 136 MMHG | HEIGHT: 70 IN

## 2025-04-14 DIAGNOSIS — I10 PRIMARY HYPERTENSION: ICD-10-CM

## 2025-04-14 DIAGNOSIS — E11.9 TYPE 2 DIABETES MELLITUS WITHOUT COMPLICATION, WITHOUT LONG-TERM CURRENT USE OF INSULIN: ICD-10-CM

## 2025-04-14 DIAGNOSIS — N18.31 STAGE 3A CHRONIC KIDNEY DISEASE (MULTI): Primary | ICD-10-CM

## 2025-04-14 PROCEDURE — 1160F RVW MEDS BY RX/DR IN RCRD: CPT | Performed by: CLINICAL NURSE SPECIALIST

## 2025-04-14 PROCEDURE — 3075F SYST BP GE 130 - 139MM HG: CPT | Performed by: CLINICAL NURSE SPECIALIST

## 2025-04-14 PROCEDURE — 1036F TOBACCO NON-USER: CPT | Performed by: CLINICAL NURSE SPECIALIST

## 2025-04-14 PROCEDURE — 1159F MED LIST DOCD IN RCRD: CPT | Performed by: CLINICAL NURSE SPECIALIST

## 2025-04-14 PROCEDURE — 99213 OFFICE O/P EST LOW 20 MIN: CPT | Performed by: CLINICAL NURSE SPECIALIST

## 2025-04-14 PROCEDURE — 3078F DIAST BP <80 MM HG: CPT | Performed by: CLINICAL NURSE SPECIALIST

## 2025-04-14 ASSESSMENT — ENCOUNTER SYMPTOMS
NEUROLOGICAL NEGATIVE: 1
PSYCHIATRIC NEGATIVE: 1
CARDIOVASCULAR NEGATIVE: 1
MUSCULOSKELETAL NEGATIVE: 1
GASTROINTESTINAL NEGATIVE: 1
ENDOCRINE NEGATIVE: 1
RESPIRATORY NEGATIVE: 1
CONSTITUTIONAL NEGATIVE: 1

## 2025-04-14 NOTE — ASSESSMENT & PLAN NOTE
Has been having more difficulty with his blood sugars, current blood sugar on his lab work was 207 which is higher than it has been in the past, he was not fasting however it is still elevated, he has been eating more sweets they will work on this along with working with increasing their activity, they do walk routinely throughout the day

## 2025-04-14 NOTE — ASSESSMENT & PLAN NOTE
Renal function is stable with creatinine of 1.58, blood pressure is well-controlled, diabetes not as well-controlled, on SGLT2

## 2025-04-14 NOTE — PROGRESS NOTES
Subjective   Patient ID: Pato Christiansen is a 75 y.o. male who presents for Follow-up (6 months/Review labs ).  Being seen in follow-up for chronic kidney disease stage IIIa with history of diabetes and hypertension    Labs reviewed  Glucose 207  Renal function with BUN of 20 and creatinine of 1.58, GFR is 45  Sodium 140, potassium 4.4, chloride 103, bicarb 25  Calcium 9.5  Urine with 3+ glucose, on SGLT2  Albumin creatinine ratio 13  Last hemoglobin A1c 7.7    He has been feeling well  His blood pressure is well-controlled  He was apparently having some dizzy spells however that has improved and he is no longer having those, she did hold his losartan for a period of time when these were occurring  He is voiding without difficulties  He has no edema  She states that his memory concerns are on and off, today is apparently a good day        Review of Systems   Constitutional: Negative.    Respiratory: Negative.     Cardiovascular: Negative.    Gastrointestinal: Negative.    Endocrine: Negative.    Genitourinary: Negative.    Musculoskeletal: Negative.    Skin: Negative.    Neurological: Negative.    Psychiatric/Behavioral: Negative.         Objective   Physical Exam  Vitals reviewed.   Constitutional:       Appearance: Normal appearance.   HENT:      Head: Normocephalic.   Cardiovascular:      Rate and Rhythm: Normal rate and regular rhythm.   Pulmonary:      Effort: Pulmonary effort is normal.      Breath sounds: Normal breath sounds.   Abdominal:      Palpations: Abdomen is soft.   Musculoskeletal:         General: Normal range of motion.   Skin:     General: Skin is warm and dry.   Neurological:      Mental Status: He is alert and oriented to person, place, and time.   Psychiatric:         Mood and Affect: Mood normal.         Behavior: Behavior normal.         Assessment/Plan   Problem List Items Addressed This Visit             ICD-10-CM    Type 2 diabetes mellitus without complication, without long-term current  use of insulin E11.9     Has been having more difficulty with his blood sugars, current blood sugar on his lab work was 207 which is higher than it has been in the past, he was not fasting however it is still elevated, he has been eating more sweets they will work on this along with working with increasing their activity, they do walk routinely throughout the day         Hypertension I10     Blood pressure well-controlled on losartan 25 mg daily         Chronic kidney disease - Primary N18.9     Renal function is stable with creatinine of 1.58, blood pressure is well-controlled, diabetes not as well-controlled, on SGLT2         Relevant Orders    Comprehensive metabolic panel    CBC    Follow Up In Nephrology     Chronic kidney disease stage IIIa with baseline creatinine 1.2-1.5  Diabetes mellitus type 2  Hypertension  Acid reflux  Anemia  Left renal cyst  Obstructive sleep apnea        Suzie Laird, JELANI-MARCO A, DNP 04/14/25 2:00 PM

## 2025-04-30 ENCOUNTER — APPOINTMENT (OUTPATIENT)
Dept: URBAN - METROPOLITAN AREA CLINIC 204 | Age: 75
Setting detail: DERMATOLOGY
End: 2025-04-30

## 2025-04-30 DIAGNOSIS — L82.1 OTHER SEBORRHEIC KERATOSIS: ICD-10-CM

## 2025-04-30 DIAGNOSIS — D22 MELANOCYTIC NEVI: ICD-10-CM

## 2025-04-30 DIAGNOSIS — L21.8 OTHER SEBORRHEIC DERMATITIS: ICD-10-CM

## 2025-04-30 DIAGNOSIS — Z85.828 PERSONAL HISTORY OF OTHER MALIGNANT NEOPLASM OF SKIN: ICD-10-CM

## 2025-04-30 DIAGNOSIS — L57.0 ACTINIC KERATOSIS: ICD-10-CM

## 2025-04-30 DIAGNOSIS — L57.8 OTHER SKIN CHANGES DUE TO CHRONIC EXPOSURE TO NONIONIZING RADIATION: ICD-10-CM

## 2025-04-30 DIAGNOSIS — D18.0 HEMANGIOMA: ICD-10-CM

## 2025-04-30 PROBLEM — D22.5 MELANOCYTIC NEVI OF TRUNK: Status: ACTIVE | Noted: 2025-04-30

## 2025-04-30 PROBLEM — D18.01 HEMANGIOMA OF SKIN AND SUBCUTANEOUS TISSUE: Status: ACTIVE | Noted: 2025-04-30

## 2025-04-30 PROCEDURE — 17000 DESTRUCT PREMALG LESION: CPT

## 2025-04-30 PROCEDURE — OTHER PRESCRIPTION MEDICATION MANAGEMENT: OTHER

## 2025-04-30 PROCEDURE — 99214 OFFICE O/P EST MOD 30 MIN: CPT | Mod: 25

## 2025-04-30 PROCEDURE — OTHER MIPS QUALITY: OTHER

## 2025-04-30 PROCEDURE — OTHER PRESCRIPTION: OTHER

## 2025-04-30 PROCEDURE — 17003 DESTRUCT PREMALG LES 2-14: CPT

## 2025-04-30 PROCEDURE — OTHER LIQUID NITROGEN: OTHER

## 2025-04-30 PROCEDURE — OTHER COUNSELING: OTHER

## 2025-04-30 RX ORDER — HYDROCORTISONE 25 MG/G
CREAM TOPICAL BID
Qty: 30 | Refills: 4 | Status: ERX | COMMUNITY
Start: 2025-04-30

## 2025-04-30 RX ORDER — KETOCONAZOLE 20 MG/G
CREAM TOPICAL TWICE A DAY
Qty: 30 | Refills: 4 | Status: ERX | COMMUNITY
Start: 2025-04-30

## 2025-04-30 ASSESSMENT — LOCATION ZONE DERM
LOCATION ZONE: TRUNK
LOCATION ZONE: ARM
LOCATION ZONE: SCALP
LOCATION ZONE: NOSE
LOCATION ZONE: NECK
LOCATION ZONE: FACE

## 2025-04-30 ASSESSMENT — LOCATION SIMPLE DESCRIPTION DERM
LOCATION SIMPLE: LEFT CHEEK
LOCATION SIMPLE: RIGHT NOSE
LOCATION SIMPLE: NECK
LOCATION SIMPLE: SCALP
LOCATION SIMPLE: LEFT FOREHEAD
LOCATION SIMPLE: CHEST
LOCATION SIMPLE: UPPER BACK
LOCATION SIMPLE: LEFT SHOULDER
LOCATION SIMPLE: LEFT UPPER BACK
LOCATION SIMPLE: RIGHT TEMPLE
LOCATION SIMPLE: LEFT ZYGOMA

## 2025-04-30 ASSESSMENT — LOCATION DETAILED DESCRIPTION DERM
LOCATION DETAILED: LEFT SUPERIOR LATERAL MALAR CHEEK
LOCATION DETAILED: RIGHT CENTRAL POSTAURICULAR SKIN
LOCATION DETAILED: LEFT CENTRAL POSTAURICULAR SKIN
LOCATION DETAILED: LEFT ANTERIOR SHOULDER
LOCATION DETAILED: LEFT SUPERIOR UPPER BACK
LOCATION DETAILED: LEFT SUPERIOR LATERAL NECK
LOCATION DETAILED: LEFT SUPERIOR MEDIAL FOREHEAD
LOCATION DETAILED: RIGHT NASAL ALA
LOCATION DETAILED: LEFT LATERAL INFERIOR CHEST
LOCATION DETAILED: LEFT CENTRAL ZYGOMA
LOCATION DETAILED: RIGHT CENTRAL TEMPLE
LOCATION DETAILED: LEFT MEDIAL MALAR CHEEK
LOCATION DETAILED: SUPERIOR THORACIC SPINE

## 2025-04-30 NOTE — PROCEDURE: PRESCRIPTION MEDICATION MANAGEMENT
Render In Strict Bullet Format?: No
Detail Level: Zone
Initiate Treatment: Mix hydrocortisone 2.5% cream mixed with ketoconazole cream.

## 2025-04-30 NOTE — PROCEDURE: LIQUID NITROGEN
Number Of Freeze-Thaw Cycles: 2 freeze-thaw cycles
Render Post-Care Instructions In Note?: no
Detail Level: Detailed
Post-Care Instructions: I reviewed with the patient in detail post-care instructions. Patient is to wear sunprotection, and avoid picking at any of the treated lesions. Pt may apply Vaseline to crusted or scabbing areas.
Duration Of Freeze Thaw-Cycle (Seconds): 5
Show Applicator Variable?: Yes
Application Tool (Optional): Liquid Nitrogen Sprayer
Aperture Size (Optional): C
Consent: The patient's verbal consent was obtained including but not limited to risks of crusting, scabbing, blistering, scarring, darker or lighter pigmentary change, recurrence, incomplete removal and infection.

## 2025-05-15 ENCOUNTER — TELEPHONE (OUTPATIENT)
Dept: PRIMARY CARE | Facility: CLINIC | Age: 75
End: 2025-05-15
Payer: MEDICARE

## 2025-05-15 DIAGNOSIS — F03.A0 MILD DEMENTIA, UNSPECIFIED DEMENTIA TYPE, UNSPECIFIED WHETHER BEHAVIORAL, PSYCHOTIC, OR MOOD DISTURBANCE OR ANXIETY: Primary | ICD-10-CM

## 2025-05-15 NOTE — TELEPHONE ENCOUNTER
Patient requesting referral for Neurology to Dr. Rachid Boothe at [facility].     Dr. Rachid Boothe  Phone [phone]  Fax [phone]

## 2025-05-20 ENCOUNTER — TELEPHONE (OUTPATIENT)
Dept: PRIMARY CARE | Facility: CLINIC | Age: 75
End: 2025-05-20
Payer: MEDICARE

## 2025-05-20 DIAGNOSIS — E11.9 TYPE 2 DIABETES MELLITUS WITHOUT COMPLICATION, WITHOUT LONG-TERM CURRENT USE OF INSULIN: ICD-10-CM

## 2025-05-20 RX ORDER — METFORMIN HYDROCHLORIDE 1000 MG/1
1000 TABLET ORAL
Qty: 60 TABLET | Refills: 0 | Status: SHIPPED | OUTPATIENT
Start: 2025-05-20

## 2025-05-20 RX ORDER — METFORMIN HYDROCHLORIDE 1000 MG/1
1000 TABLET ORAL
Qty: 60 TABLET | Refills: 0 | OUTPATIENT
Start: 2025-05-20

## 2025-05-20 NOTE — TELEPHONE ENCOUNTER
Message from wife that they are getting repeated messages from [facility] that his Metformin has been delayed.  Requesting a Rx for short supply be sent to [facility].    Also, she requested a referral for a different Neurologist at [facility] and hasn't heard anything from them.   They keep getting messages from  about a referral, but they want to see someone at [facility] and she gave us a fax number for that.  Checking  to see if it has been done and status

## 2025-05-20 NOTE — TELEPHONE ENCOUNTER
I spoke to pt wife, referral was faxed over to [facility] 5-15-25  I gave her the number to call and see if they could tico an apt- she will let us know if there is any issue with the referral

## 2025-05-29 ENCOUNTER — TELEPHONE (OUTPATIENT)
Dept: PRIMARY CARE | Facility: CLINIC | Age: 75
End: 2025-05-29
Payer: MEDICARE

## 2025-05-29 NOTE — TELEPHONE ENCOUNTER
Call from Dr Riojas's office   [phone] - they would kyle to have any neurology documentation sent to them prior to tico a new pt apt   I do not see any testing or OV notes from neurology  please advise

## 2025-07-02 ENCOUNTER — EVALUATION (OUTPATIENT)
Dept: PHYSICAL THERAPY | Facility: CLINIC | Age: 75
End: 2025-07-02
Payer: MEDICARE

## 2025-07-02 DIAGNOSIS — R26.89 BALANCE DISORDER: ICD-10-CM

## 2025-07-02 DIAGNOSIS — Z74.09 IMPAIRED MOBILITY AND ADLS: ICD-10-CM

## 2025-07-02 DIAGNOSIS — Z78.9 IMPAIRED MOBILITY AND ADLS: ICD-10-CM

## 2025-07-02 PROCEDURE — 97110 THERAPEUTIC EXERCISES: CPT | Mod: GP

## 2025-07-02 PROCEDURE — 97161 PT EVAL LOW COMPLEX 20 MIN: CPT | Mod: GP

## 2025-07-02 ASSESSMENT — ENCOUNTER SYMPTOMS
LOSS OF SENSATION IN FEET: 0
OCCASIONAL FEELINGS OF UNSTEADINESS: 1
DEPRESSION: 0

## 2025-07-02 ASSESSMENT — PATIENT HEALTH QUESTIONNAIRE - PHQ9
SUM OF ALL RESPONSES TO PHQ9 QUESTIONS 1 AND 2: 0
1. LITTLE INTEREST OR PLEASURE IN DOING THINGS: NOT AT ALL
2. FEELING DOWN, DEPRESSED OR HOPELESS: NOT AT ALL

## 2025-07-02 ASSESSMENT — PAIN - FUNCTIONAL ASSESSMENT: PAIN_FUNCTIONAL_ASSESSMENT: 0-10

## 2025-07-02 NOTE — Clinical Note
July 2, 2025    MODESTA Antonio  [address]  [place] [address]    Patient: Pato Christiansen   YOB: 1950   Date of Visit: 7/2/2025       Dear MODESTA Antonio  [address], [address] [facility] [address] [facility] [address]  [place] [address]    The attached plan of care is being sent to you because your patient’s medical reimbursement requires that you certify the plan of care. Your signature is required to allow uninterrupted insurance coverage.      You may indicate your approval by signing below and faxing this form back to us at Dept Fax: [phone].    Please call Dept: [phone] with any questions or concerns.    Thank you for this referral,        Alexandro Alberto, PT  [address] [address]    Payer: Payor: [employer] / Plan: [employer]ATH[employer] M[employer]OZ [employer]ICARE / Product Type: *No Product type* /                                                                         Date:     Dear Alexandro Alberto PT,     Re: Mr. Pato Christiansen, MRN:[id number]    I certify that I have reviewed the attached plan of care and it is medically necessary for Mr. Pato Christiansen (1950) who is under my care.          ______________________________________                    _________________  Provider name and credentials                                           Date and time                                                                                           Plan of Care 7/2/25   Effective from: 7/2/2025  Effective to: 9/30/2025    Plan ID: [id number]            Participants as of Finalize on 7/2/2025    Name Type Comments Contact Info    MODESTA Antonio Referring Provider  [phone]    Alexandro Alberto PT Physical Therapist  [phone]       Last Plan Note     Author: Alexandro Alberto PT Status: Signed Last edited: 7/2/2025 11:30 AM       Physical Therapy Evaluation and Treatment      Patient Name: Pato QUAN  Kuldip  MRN: [id number]  Today's Date: 2025  : 1950  Tiffany Kotharielle  Time Calculation  Start Time: 1132  Stop Time: 1205  Time Calculation (min): 33 min    PT Evaluation Time Entry  PT Evaluation (Low) Time Entry: 23   PT Therapeutic Procedures Time Entry  Therapeutic Exercise Time Entry: 10                         Assessment:  Rehab Prognosis: Fair (previous PT for balance)  Barriers to Participation:  (none)    Plan:  PT Plan: Skilled PT  PT Frequency: 2 times per week  Duration: 4wks  Onset Date: 21  Certification Period Start Date: 25  Certification Period End Date: 25  Rehab Potential: Fair (previous PT for balance)  Plan of Care Agreement: Patient    Current Problem:   1. Impaired mobility and ADLs  Referral to Physical Therapy      2. Balance disorder  Referral to Physical Therapy    Follow Up In Physical Therapy          Subjective    General:  General  Reason for Referral: Balance disorder  Referred By: Saniya STRICKLAND  General Comment:   C/C sx*/Max sx level*:no pain; here for balance (per the patient dizziness is not an issue)  OTONIEL/DOI/Work*?:  chronic sx x 4yrs with previous rehab  ADL makes worse*?:walking on uneven ground  ADL makes better?:------  PLOF:Unlimited job/home tasks performed-NO: ADL gait:   (see goals)  Testing*:no recent testing in MR    Physical Findings*: Limited:                                                Strength (gross BLE )                                                 The patient/wife admits to not being consistent with previous rehab-recommended HEP for balance/gait.  POC:  Ongoing clinic PT to include:continue with BLE PRE; gait/balance activities    Other: (copay*?-no  ) (fall risk*?- mod  ) (ins visit limit*?-no   )     Precautions:  Precautions  STEADI Fall Risk Score (The score of 4 or more indicates an increased risk of falling): 3  Precautions Comment: mod fall risk; mild dementia; essential tremor LUE; L knee pain HX    Pain:  Pain  "Assessment  Pain Assessment: 0-10    Prior Level of Function:  Prior Function Per Pt/Caregiver Report  Vocational: Retired    Objective     Outcome Measures:  Other Measures  Activities - Specific Balance Confidence Scale: 1040     Treatments:  BHR x10  BTR x10  Wall march x5 ea  POC:  Ongoing clinic PT to include:continue with BLE PRE; gait/balance activities  OP EDUCATION:  Access Code: [email or web address]  URL: [email or web address]  Date: 07/02/2025  Prepared by: Toni Alberto    Exercises  - Heel Raise   - Toe Raises with Counter Support   - Wall Squat with Leg Lifts     Goals:  Active       PT Problem       PT Goal 1       Start:  07/02/25    Expected End:  09/30/25       1. Independent HEP to allow for 50% reduction in max ADL C/C sx-gait confidence  2. Survey score improvement from 65% to 75% (ABC) 3-4wks  3. Strength increase to allow for improved ADL gait (from 4/5 to 4+/5 B gross knees/hips/ankles) 3-4wks         PT Goal 2       Start:  07/02/25    Expected End:  09/30/25       1. \"I want my  gait  to feel better\".              HIP        Hip Observation  Observation Comment: trunk FB; wide CAROLYN; B toe-out; mildly uneven majo with gait into clinic           Lower Extremity Strength: WFL unless documented  MMT 5/5 max  RIGHT LEFT   Hip Flexion    4 /5    4 /5   Hip Extension    5 /5    5 /5   Hip Abduction    4+ /5     4+/5        Knee Extension    4 /5    4 /5   Knee Flexion    4 /5     4/5   Ankle DF    4 /5    4 /5   Ankle PF    4 /5     4/5   Ankle INV     4/5    4 /5   Ankle EV    4 /5    4 /5            Current Participants as of 7/2/2025    Name Type Comments Contact Info    Tiffany Kothari, APRN-CNP Referring Provider  [phone]    Signature pending    Alexandro Alberto, PT Physical Therapist  [phone]          "

## 2025-07-02 NOTE — PROGRESS NOTES
Physical Therapy Evaluation and Treatment      Patient Name: Pato Christiansen  MRN: [id number]  Today's Date: 2025  : 1950  Tiffany Kothari  Time Calculation  Start Time: 1132  Stop Time: 1205  Time Calculation (min): 33 min    PT Evaluation Time Entry  PT Evaluation (Low) Time Entry: 23   PT Therapeutic Procedures Time Entry  Therapeutic Exercise Time Entry: 10                         Assessment:  Rehab Prognosis: Fair (previous PT for balance)  Barriers to Participation:  (none)    Plan:  PT Plan: Skilled PT  PT Frequency: 2 times per week  Duration: 4wks  Onset Date: 21  Certification Period Start Date: 25  Certification Period End Date: 25  Rehab Potential: Fair (previous PT for balance)  Plan of Care Agreement: Patient    Current Problem:   1. Impaired mobility and ADLs  Referral to Physical Therapy      2. Balance disorder  Referral to Physical Therapy    Follow Up In Physical Therapy          Subjective    General:  General  Reason for Referral: Balance disorder  Referred By: Saniya STRICKLAND  General Comment:   C/C sx*/Max sx level*:no pain; here for balance (per the patient dizziness is not an issue)  OTONIEL/DOI/Work*?:  chronic sx x 4yrs with previous rehab  ADL makes worse*?:walking on uneven ground  ADL makes better?:------  PLOF:Unlimited job/home tasks performed-NO: ADL gait:   (see goals)  Testing*:no recent testing in MR    Physical Findings*: Limited:                                                Strength (gross BLE )                                                 The patient/wife admits to not being consistent with previous rehab-recommended HEP for balance/gait.  POC:  Ongoing clinic PT to include:continue with BLE PRE; gait/balance activities    Other: (copay*?-no  ) (fall risk*?- mod  ) (ins visit limit*?-no   )     Precautions:  Precautions  STEADI Fall Risk Score (The score of 4 or more indicates an increased risk of falling): 3  Precautions Comment: mod fall risk;  "mild dementia; essential tremor LUE; L knee pain HX    Pain:  Pain Assessment  Pain Assessment: 0-10    Prior Level of Function:  Prior Function Per Pt/Caregiver Report  Vocational: Retired    Objective     Outcome Measures:  Other Measures  Activities - Specific Balance Confidence Scale: 1040     Treatments:  BHR x10  BTR x10  Wall march x5 ea  POC:  Ongoing clinic PT to include:continue with BLE PRE; gait/balance activities  OP EDUCATION:  Access Code: [email or web address]  URL: [email or web address]  Date: 07/02/2025  Prepared by: Toni Alberto    Exercises  - Heel Raise   - Toe Raises with Counter Support   - Wall Squat with Leg Lifts     Goals:  Active       PT Problem       PT Goal 1       Start:  07/02/25    Expected End:  09/30/25       1. Independent HEP to allow for 50% reduction in max ADL C/C sx-gait confidence  2. Survey score improvement from 65% to 75% (ABC) 3-4wks  3. Strength increase to allow for improved ADL gait (from 4/5 to 4+/5 B gross knees/hips/ankles) 3-4wks         PT Goal 2       Start:  07/02/25    Expected End:  09/30/25       1. \"I want my  gait  to feel better\".              HIP        Hip Observation  Observation Comment: trunk FB; wide CAROLYN; B toe-out; mildly uneven majo with gait into clinic           Lower Extremity Strength: WFL unless documented  MMT 5/5 max  RIGHT LEFT   Hip Flexion    4 /5    4 /5   Hip Extension    5 /5    5 /5   Hip Abduction    4+ /5     4+/5        Knee Extension    4 /5    4 /5   Knee Flexion    4 /5     4/5   Ankle DF    4 /5    4 /5   Ankle PF    4 /5     4/5   Ankle INV     4/5    4 /5   Ankle EV    4 /5    4 /5     "

## 2025-07-03 LAB
ALBUMIN SERPL-MCNC: 4.7 G/DL (ref 3.6–5.1)
ALP SERPL-CCNC: 60 U/L (ref 35–144)
ALT SERPL-CCNC: 17 U/L (ref 9–46)
ANION GAP SERPL CALCULATED.4IONS-SCNC: 11 MMOL/L (CALC) (ref 7–17)
AST SERPL-CCNC: 18 U/L (ref 10–35)
BILIRUB SERPL-MCNC: 0.7 MG/DL (ref 0.2–1.2)
BUN SERPL-MCNC: 20 MG/DL (ref 7–25)
CALCIUM SERPL-MCNC: 9.6 MG/DL (ref 8.6–10.3)
CHLORIDE SERPL-SCNC: 104 MMOL/L (ref 98–110)
CHOLEST SERPL-MCNC: 151 MG/DL
CHOLEST/HDLC SERPL: 3.5 (CALC)
CO2 SERPL-SCNC: 25 MMOL/L (ref 20–32)
CREAT SERPL-MCNC: 1.53 MG/DL (ref 0.7–1.28)
EGFRCR SERPLBLD CKD-EPI 2021: 47 ML/MIN/1.73M2
ERYTHROCYTE [DISTWIDTH] IN BLOOD BY AUTOMATED COUNT: 12.3 % (ref 11–15)
EST. AVERAGE GLUCOSE BLD GHB EST-MCNC: 197 MG/DL
EST. AVERAGE GLUCOSE BLD GHB EST-SCNC: 10.9 MMOL/L
GLUCOSE SERPL-MCNC: 184 MG/DL (ref 65–139)
HBA1C MFR BLD: 8.5 %
HCT VFR BLD AUTO: 40.9 % (ref 38.5–50)
HDLC SERPL-MCNC: 43 MG/DL
HGB BLD-MCNC: 13.2 G/DL (ref 13.2–17.1)
LDLC SERPL CALC-MCNC: 83 MG/DL (CALC)
MCH RBC QN AUTO: 33 PG (ref 27–33)
MCHC RBC AUTO-ENTMCNC: 32.3 G/DL (ref 32–36)
MCV RBC AUTO: 102.3 FL (ref 80–100)
NONHDLC SERPL-MCNC: 108 MG/DL (CALC)
PLATELET # BLD AUTO: 142 THOUSAND/UL (ref 140–400)
PMV BLD REES-ECKER: 11.1 FL (ref 7.5–12.5)
POTASSIUM SERPL-SCNC: 4.7 MMOL/L (ref 3.5–5.3)
PROT SERPL-MCNC: 6.9 G/DL (ref 6.1–8.1)
PSA SERPL-MCNC: 0.38 NG/ML
RBC # BLD AUTO: 4 MILLION/UL (ref 4.2–5.8)
SODIUM SERPL-SCNC: 140 MMOL/L (ref 135–146)
TRIGL SERPL-MCNC: 152 MG/DL
WBC # BLD AUTO: 5.6 THOUSAND/UL (ref 3.8–10.8)

## 2025-07-11 ENCOUNTER — APPOINTMENT (OUTPATIENT)
Dept: PRIMARY CARE | Facility: CLINIC | Age: 75
End: 2025-07-11
Payer: MEDICARE

## 2025-07-11 VITALS
HEART RATE: 54 BPM | HEIGHT: 70 IN | DIASTOLIC BLOOD PRESSURE: 64 MMHG | BODY MASS INDEX: 27.06 KG/M2 | WEIGHT: 189 LBS | OXYGEN SATURATION: 94 % | SYSTOLIC BLOOD PRESSURE: 120 MMHG

## 2025-07-11 DIAGNOSIS — Z00.00 ROUTINE GENERAL MEDICAL EXAMINATION AT HEALTH CARE FACILITY: Primary | ICD-10-CM

## 2025-07-11 DIAGNOSIS — R41.3 MEMORY DIFFICULTY: ICD-10-CM

## 2025-07-11 DIAGNOSIS — N18.31 STAGE 3A CHRONIC KIDNEY DISEASE (MULTI): ICD-10-CM

## 2025-07-11 DIAGNOSIS — E11.9 TYPE 2 DIABETES MELLITUS WITHOUT COMPLICATION, WITHOUT LONG-TERM CURRENT USE OF INSULIN: ICD-10-CM

## 2025-07-11 DIAGNOSIS — I10 HYPERTENSION, UNSPECIFIED TYPE: ICD-10-CM

## 2025-07-11 DIAGNOSIS — E11.69 TYPE 2 DIABETES MELLITUS WITH OTHER SPECIFIED COMPLICATION, UNSPECIFIED WHETHER LONG TERM INSULIN USE (MULTI): ICD-10-CM

## 2025-07-11 DIAGNOSIS — N18.31 ANEMIA DUE TO STAGE 3A CHRONIC KIDNEY DISEASE: ICD-10-CM

## 2025-07-11 DIAGNOSIS — D63.1 ANEMIA DUE TO STAGE 3A CHRONIC KIDNEY DISEASE: ICD-10-CM

## 2025-07-11 PROCEDURE — 1159F MED LIST DOCD IN RCRD: CPT

## 2025-07-11 PROCEDURE — 1170F FXNL STATUS ASSESSED: CPT

## 2025-07-11 PROCEDURE — 3074F SYST BP LT 130 MM HG: CPT

## 2025-07-11 PROCEDURE — G0439 PPPS, SUBSEQ VISIT: HCPCS

## 2025-07-11 PROCEDURE — 99214 OFFICE O/P EST MOD 30 MIN: CPT

## 2025-07-11 PROCEDURE — 1036F TOBACCO NON-USER: CPT

## 2025-07-11 PROCEDURE — 3078F DIAST BP <80 MM HG: CPT

## 2025-07-11 RX ORDER — PRAVASTATIN SODIUM 20 MG/1
20 TABLET ORAL DAILY
Qty: 90 TABLET | Refills: 3 | Status: SHIPPED | OUTPATIENT
Start: 2025-07-11 | End: 2026-07-11

## 2025-07-11 RX ORDER — METFORMIN HYDROCHLORIDE 1000 MG/1
1000 TABLET ORAL
Qty: 180 TABLET | Refills: 3 | Status: SHIPPED | OUTPATIENT
Start: 2025-07-11 | End: 2026-07-11

## 2025-07-11 ASSESSMENT — ENCOUNTER SYMPTOMS
RESPIRATORY NEGATIVE: 1
GASTROINTESTINAL NEGATIVE: 1
CONSTITUTIONAL NEGATIVE: 1
CARDIOVASCULAR NEGATIVE: 1

## 2025-07-11 ASSESSMENT — ACTIVITIES OF DAILY LIVING (ADL)
MANAGING_FINANCES: NEEDS ASSISTANCE
DRESSING: INDEPENDENT
BATHING: INDEPENDENT
TAKING_MEDICATION: INDEPENDENT
GROCERY_SHOPPING: NEEDS ASSISTANCE
DOING_HOUSEWORK: INDEPENDENT

## 2025-07-11 NOTE — PROGRESS NOTES
"Subjective   Reason for Visit: Pato Christiansen is an 75 y.o. male here for a Medicare Wellness visit.     Past Medical, Surgical, and Family History reviewed and updated in chart.    Reviewed all medications by prescribing practitioner or clinical pharmacist (such as prescriptions, OTCs, herbal therapies and supplements) and documented in the medical record.    HPI  HYPERTENSION: BP good in office today 120/64. Compliant with regimen, endorses BP <130/80 at home. Denies CP/SOB/visual changes/dizziness.  DIABETES MELLITUS TYPE 2: Has not been checking at home. Last A1C 8.5. Compliant with medication, no SE.  CKD: Still seeing Dr. Laird. GFR stable.  GERD: Stopped famotidine, has had no issues.  ANEMIA/FATIGUE/THROMBOCYTOPENIA: Was seeing Dr. Smith, but now Dr. Laird watches this. Hgb stable.  HISTORY OF COLON POLYPS: Colonoscopy 1/13/2020. No polyps. Was told no further colonoscopies needed.  SLEEP APNEA: Still using his CPap. Doing well with it. More prn now.  DIASTASIS RECTI: Unchanged.  URINARY FREQUENCY: Unchanged. 8 times daily, once nightly. This most likely d/t Farxiga. Would not like urology referral.  [facility]FFIC[facility] in [place] 6/[facility]21 which was unremarkable. Has been taking Aricept, no SE. PT soon.  LEFT SHOULDER PAIN: L shoulder surgery 2022. Had PT, follows with Dr. Blount and will get occasional injection.  LOW B12: Taking 500mcg daily.  LOW VIT D: Taking 5000IU daily.  CT Cardiac score 207/moderate risk 1/25/22.     UTD flu vaccines  UTD pneumonia vaccines  Got COVID Vaccines and booster   Shingles vaccinated    Patient Care Team:  Silvio Singh PA-C as PCP - General  Rustam Mackenzie MD as PCP - [employer] PCP     Review of Systems   Constitutional: Negative.    Respiratory: Negative.     Cardiovascular: Negative.    Gastrointestinal: Negative.        Objective   Vitals:  /64   Pulse 54   Ht 1.778 m (5' 10\")   Wt 85.7 kg (189 lb)   SpO2 94%   " BMI 27.12 kg/m²       Physical Exam  Constitutional:       General: He is not in acute distress.     Appearance: Normal appearance. He is not ill-appearing.   HENT:      Head: Normocephalic and atraumatic.   Eyes:      Extraocular Movements: Extraocular movements intact.      Conjunctiva/sclera: Conjunctivae normal.   Cardiovascular:      Rate and Rhythm: Normal rate.   Pulmonary:      Effort: Pulmonary effort is normal.   Abdominal:      General: There is no distension.   Musculoskeletal:      Cervical back: Normal range of motion.   Skin:     General: Skin is warm and dry.   Neurological:      General: No focal deficit present.      Mental Status: He is alert and oriented to person, place, and time.   Psychiatric:         Mood and Affect: Mood normal.         Behavior: Behavior normal.         Thought Content: Thought content normal.         Judgment: Judgment normal.         Assessment & Plan  Type 2 diabetes mellitus without complication, without long-term current use of insulin    Orders:    metFORMIN (Glucophage) 1,000 mg tablet; Take 1 tablet (1,000 mg) by mouth 2 times daily (morning and late afternoon).    pravastatin (Pravachol) 20 mg tablet; Take 1 tablet (20 mg) by mouth once daily.    Hemoglobin A1C; Future    Routine general medical examination at health care facility         Type 2 diabetes mellitus with other specified complication, unspecified whether long term insulin use (Multi)         Hypertension, unspecified type         Memory difficulty         Anemia due to stage 3a chronic kidney disease         Stage 3a chronic kidney disease (Multi)                 Discussed options for A1C control, they would like to make diet changes before adding more medication. Will recheck A1C in 3 months with Suzie's labs, if still elevated will add to regimen.  Other labs reviewed and stable.  No medication changes today.  Follow up 6 months or sooner prn.

## 2025-07-11 NOTE — ASSESSMENT & PLAN NOTE
Orders:    metFORMIN (Glucophage) 1,000 mg tablet; Take 1 tablet (1,000 mg) by mouth 2 times daily (morning and late afternoon).    pravastatin (Pravachol) 20 mg tablet; Take 1 tablet (20 mg) by mouth once daily.    Hemoglobin A1C; Future

## 2025-07-14 ENCOUNTER — TREATMENT (OUTPATIENT)
Dept: PHYSICAL THERAPY | Facility: CLINIC | Age: 75
End: 2025-07-14
Payer: MEDICARE

## 2025-07-14 DIAGNOSIS — R26.89 BALANCE DISORDER: ICD-10-CM

## 2025-07-14 PROCEDURE — 97110 THERAPEUTIC EXERCISES: CPT | Mod: GP,CQ

## 2025-07-14 ASSESSMENT — PAIN - FUNCTIONAL ASSESSMENT: PAIN_FUNCTIONAL_ASSESSMENT: 0-10

## 2025-07-14 ASSESSMENT — PAIN SCALES - GENERAL: PAINLEVEL_OUTOF10: 0 - NO PAIN

## 2025-07-14 NOTE — PROGRESS NOTES
"Physical Therapy Treatment    Patient Name: Pato Christiansen  MRN: [id number]  Today's Date: 7/14/2025  Time Calculation  Start Time: 1400  Stop Time: 1440  Time Calculation (min): 40 min  PT Therapeutic Procedures Time Entry  Therapeutic Exercise Time Entry: 38       Assessment:   Patient requires verbal cues for proper form with tandem stance this date with patient brooke fair understanding. Patient able to incorporate new PRE's with no c/o increased symptoms. Patient demo's challenges with ambulation at end of session with verbal cues needed for proper gait pattern with patient brooke good understanding.     Plan:  Continue to work on improving balance to be able to decrease risk of falls. -AB.     OP PT Plan  Treatment/Interventions: Aquatic therapy, Education/ Instruction, Gait training, Manual therapy, Neuromuscular re-education, Self care/ home management, Therapeutic exercises  PT Plan: Skilled PT  PT Frequency: 2 times per week  Duration: 4wks  Onset Date: 07/02/21  Certification Period Start Date: 07/02/25  Certification Period End Date: 09/30/25  Rehab Potential: Fair (previous PT for balance)  Plan of Care Agreement: Patient    Current Problem  Problem List Items Addressed This Visit           ICD-10-CM    Balance disorder R26.89       Subjective   General  Reason for Referral: Balance disorder  Referred By: Saniya STRICKLAND  General Comment: 2/9  Visit: 2  HEP:   Patient states that he's not having pain today.     Precautions  Precautions  Precautions Comment: mod fall risk; mild dementia; essential tremor LUE; L knee pain HX    Pain  Pain Assessment: 0-10  0-10 (Numeric) Pain Score: 0 - No pain    Objective     Treatments:  Therapeutic Exercise: 38 minutes, 3 units  SciFit x5' (N)  Step ups 6\" step 2x10 Bilat leading (N)  Standing BHR 2x10  Standing BTR 2x10  Seated hip adduction 2x10 5\" hold (N)  Seated hip abduction 2x10 [place] tband (N)  Seated marches 2x10 [place] tband (N)  LAQ 2x10 Bilat (N)  STS x10 " "(N)  SLR x15 Bilat (N)  Bridging x10 (N)  Tandem stance 3x30\" Bilat (N)  Wall march x5 ea    POC:  Ongoing clinic PT to include:continue with BLE PRE; gait/balance activities    OP EDUCATION:  Access Code: [email or web address]  URL: [email or web address]  Date: 07/02/2025  Prepared by: Toni Alberto    Exercises  - Heel Raise   - Toe Raises with Counter Support   - Wall Squat with Leg Lifts       Goals:  Active       PT Problem       PT Goal 1       Start:  07/02/25    Expected End:  09/30/25       1. Independent HEP to allow for 50% reduction in max ADL C/C sx-gait confidence  2. Survey score improvement from 65% to 75% (ABC) 3-4wks  3. Strength increase to allow for improved ADL gait (from 4/5 to 4+/5 B gross knees/hips/ankles) 3-4wks         PT Goal 2       Start:  07/02/25    Expected End:  09/30/25       1. \"I want my  gait  to feel better\".             "

## 2025-07-18 ENCOUNTER — TREATMENT (OUTPATIENT)
Dept: PHYSICAL THERAPY | Facility: CLINIC | Age: 75
End: 2025-07-18
Payer: MEDICARE

## 2025-07-18 DIAGNOSIS — R26.89 BALANCE DISORDER: ICD-10-CM

## 2025-07-18 PROCEDURE — 97110 THERAPEUTIC EXERCISES: CPT | Mod: GP,CQ

## 2025-07-18 ASSESSMENT — PAIN SCALES - GENERAL: PAINLEVEL_OUTOF10: 8

## 2025-07-18 ASSESSMENT — PAIN - FUNCTIONAL ASSESSMENT: PAIN_FUNCTIONAL_ASSESSMENT: 0-10

## 2025-07-18 NOTE — PROGRESS NOTES
"Physical Therapy Treatment    Patient Name: Pato Christiansen  MRN: [id number]  Today's Date: 7/18/2025  Time Calculation  Start Time: 1400  Stop Time: 1440  Time Calculation (min): 40 min  PT Therapeutic Procedures Time Entry  Therapeutic Exercise Time Entry: 38       Assessment:   Patient requires tactile and verbal cues for proper form with tandem stance this date. Patient requires verbal cues for proper form with SLR. Patient demo's fatigue at end of session.     Plan:  Continue to work on improving balance to decrease risk of falls. -AB.     OP PT Plan  Treatment/Interventions: Aquatic therapy, Education/ Instruction, Gait training, Manual therapy, Neuromuscular re-education, Self care/ home management, Therapeutic exercises  PT Plan: Skilled PT  PT Frequency: 2 times per week  Duration: 4wks  Onset Date: 07/02/21  Certification Period Start Date: 07/02/25  Certification Period End Date: 09/30/25  Rehab Potential: Fair (previous PT for balance)  Plan of Care Agreement: Patient    Current Problem  Problem List Items Addressed This Visit           ICD-10-CM    Balance disorder R26.89       Subjective   General  Reason for Referral: Balance disorder  Referred By: Saniya STRICKLAND  General Comment: 3/9  Visit: 3  HEP: Yes  Patient states that his shoulder has just recently started bothering him.    Precautions  Precautions  Precautions Comment: mod fall risk; mild dementia; essential tremor LUE; L knee pain HX    Pain  Pain Assessment: 0-10  0-10 (Numeric) Pain Score: 8  Pain Location: Shoulder  Pain Orientation: Left    Objective     Treatments:  Therapeutic Exercise: 38 minutes, 3 units  SciFit x6' (P, time)   Step ups 6\" step 2x10 Bilat leading   Standing BHR 2x10  Standing BTR 2x10  Seated hip adduction 2x10 5\" hold   Seated hip abduction 2x10 Mint Green tband   Seated marches 2x10 Mint Green tband   LAQ 2x10 Bilat   STS x10   SLR x15 Bilat   Bridging x15 (P, reps)    Tandem stance 3x30\" Bilat   Wall march x10 ea (P, reps) " "    POC:  Ongoing clinic PT to include:continue with BLE PRE; gait/balance activities    OP EDUCATION:  Access Code: [email or web address]  URL: [email or web address]  Date: 07/02/2025  Prepared by: Toni Alberto    Exercises  - Heel Raise   - Toe Raises with Counter Support   - Wall Squat with Leg Lifts       Goals:  Active       PT Problem       PT Goal 1       Start:  07/02/25    Expected End:  09/30/25       1. Independent HEP to allow for 50% reduction in max ADL C/C sx-gait confidence  2. Survey score improvement from 65% to 75% (ABC) 3-4wks  3. Strength increase to allow for improved ADL gait (from 4/5 to 4+/5 B gross knees/hips/ankles) 3-4wks         PT Goal 2       Start:  07/02/25    Expected End:  09/30/25       1. \"I want my  gait  to feel better\".             "

## 2025-07-21 ENCOUNTER — DOCUMENTATION (OUTPATIENT)
Dept: PHYSICAL THERAPY | Facility: CLINIC | Age: 75
End: 2025-07-21
Payer: MEDICARE

## 2025-07-21 ENCOUNTER — APPOINTMENT (OUTPATIENT)
Dept: PHYSICAL THERAPY | Facility: CLINIC | Age: 75
End: 2025-07-21
Payer: MEDICARE

## 2025-07-21 NOTE — PROGRESS NOTES
Physical Therapy                 Therapy Communication Note    Patient Name: Pato Christiansen  MRN: [id number]  Department:   Room: Room/bed info not found  Today's Date: 7/21/2025     Discipline: Physical Therapy    Missed Visit:       Missed Visit Reason:      Missed Time: Cancel    Comment: Illness

## 2025-07-25 ENCOUNTER — TREATMENT (OUTPATIENT)
Dept: PHYSICAL THERAPY | Facility: CLINIC | Age: 75
End: 2025-07-25
Payer: MEDICARE

## 2025-07-25 DIAGNOSIS — R26.89 BALANCE DISORDER: ICD-10-CM

## 2025-07-25 PROCEDURE — 97110 THERAPEUTIC EXERCISES: CPT | Mod: GP,CQ

## 2025-07-25 ASSESSMENT — PAIN - FUNCTIONAL ASSESSMENT: PAIN_FUNCTIONAL_ASSESSMENT: 0-10

## 2025-07-25 ASSESSMENT — PAIN SCALES - GENERAL: PAINLEVEL_OUTOF10: 0 - NO PAIN

## 2025-07-25 NOTE — PROGRESS NOTES
"Physical Therapy Treatment    Patient Name: Pato Christiansen  MRN: [id number]  Today's Date: 7/25/2025  Time Calculation  Start Time: 1400  Stop Time: 1442  Time Calculation (min): 42 min  PT Therapeutic Procedures Time Entry  Therapeutic Exercise Time Entry: 40       Assessment:   Patient able to tolerate session with mild challenges. Requires tactile cues for proper form with standing tandem stance, and SLR. HEP updated and reviewed with patient and spouse mini'fidelina good understanding.     Plan:  Continue to work on improving strength and balance to be able to tolerate prolonged ambulation on uneven surfaces for decreased risk of falls. -AB.     OP PT Plan  Treatment/Interventions: Aquatic therapy, Education/ Instruction, Gait training, Manual therapy, Neuromuscular re-education, Self care/ home management, Therapeutic exercises  PT Plan: Skilled PT  PT Frequency: 2 times per week  Duration: 4wks  Onset Date: 07/02/21  Certification Period Start Date: 07/02/25  Certification Period End Date: 09/30/25  Rehab Potential: Fair (previous PT for balance)  Plan of Care Agreement: Patient    Current Problem  Problem List Items Addressed This Visit           ICD-10-CM    Balance disorder R26.89       Subjective   General  Reason for Referral: Balance disorder  Referred By: Saniya STRICKLAND  General Comment: 4/9  Visit: 4  HEP: Yes  Patient states that he's not having pain currently.     Precautions  Precautions  Precautions Comment: mod fall risk; mild dementia; essential tremor LUE; L knee pain HX    Pain  Pain Assessment: 0-10  0-10 (Numeric) Pain Score: 0 - No pain    Objective     Treatments:  Therapeutic Exercise: 40 minutes, 3 units  SciFit x6' (P, time)   Step ups 6\" step 2x10 Bilat leading   Standing BHR 2x10  Standing BTR 2x10  Seated hip adduction 2x10 5\" hold   Seated hip abduction 2x10 Mint Green tband   Seated marches 2x10 Mint Green tband   LAQ 2x10 Bilat   STS x10   SLR x15 Bilat   Bridging x15 (P, reps)    Tandem stance " "3x30\" Bilat   Wall march x10 ea (P, reps)     POC:  Ongoing clinic PT to include:continue with BLE PRE; gait/balance activities    OP EDUCATION:  Access Code: [email or web address]  URL: [email or web address]  Date: 07/25/2025  Prepared by: Mare Vega    Exercises  - Seated Hip Abduction with Resistance  - 1 x daily - 7 x weekly - 3 sets - 10 reps  - Seated Hip Adduction Isometrics with Ball  - 1 x daily - 7 x weekly - 3 sets - 10 reps  - Sit to Stand  - 1 x daily - 7 x weekly - 3 sets - 10 reps  - Standing Tandem Balance with Counter Support  - 1 x daily - 7 x weekly - 3 reps - 30 hold  - Supine Active Straight Leg Raise  - 1 x daily - 7 x weekly - 3 sets - 10 reps    Access Code: [email or web address]  URL: [email or web address]  Date: 07/02/2025  Prepared by: Toni Alberto    Exercises  - Heel Raise   - Toe Raises with Counter Support   - Wall Squat with Leg Lifts       Goals:  Active       PT Problem       PT Goal 1       Start:  07/02/25    Expected End:  09/30/25       1. Independent HEP to allow for 50% reduction in max ADL C/C sx-gait confidence  2. Survey score improvement from 65% to 75% (ABC) 3-4wks  3. Strength increase to allow for improved ADL gait (from 4/5 to 4+/5 B gross knees/hips/ankles) 3-4wks         PT Goal 2       Start:  07/02/25    Expected End:  09/30/25       1. \"I want my  gait  to feel better\".             "

## 2025-07-28 ENCOUNTER — TREATMENT (OUTPATIENT)
Dept: PHYSICAL THERAPY | Facility: CLINIC | Age: 75
End: 2025-07-28
Payer: MEDICARE

## 2025-07-28 DIAGNOSIS — R26.89 BALANCE DISORDER: ICD-10-CM

## 2025-07-28 PROCEDURE — 97110 THERAPEUTIC EXERCISES: CPT | Mod: GP,CQ

## 2025-07-28 PROCEDURE — 97112 NEUROMUSCULAR REEDUCATION: CPT | Mod: GP,CQ

## 2025-07-28 ASSESSMENT — PAIN - FUNCTIONAL ASSESSMENT: PAIN_FUNCTIONAL_ASSESSMENT: 0-10

## 2025-07-28 ASSESSMENT — PAIN SCALES - GENERAL: PAINLEVEL_OUTOF10: 0 - NO PAIN

## 2025-07-28 NOTE — PROGRESS NOTES
"Physical Therapy Treatment    Patient Name: Pato Christiansen  MRN: [id number]  : 1950  [place]  Today's Date: 2025  Time Calculation  Start Time: 1403  Stop Time: 1445  Time Calculation (min): 42 min  PT Therapeutic Procedures Time Entry  Therapeutic Exercise Time Entry: 25  Neuromuscular Re-Education Time Entry: 15         Assessment:   Patient identified by name & .  Treatment consisted of ex's and initiated NMR activities. Patient had difficulty sequencing tandem ambulation and feet placement for tandem stance. Combination of 2 movements at one time was very difficult for patient to follow. Used colored stars to cue for foot placement and assisted patient with UE motions. Patient denied pain post treatment.     Plan:  Continue to progress with NMR activities and ex's for improving balance and decreasing fall risk. -MA   OP PT Plan  Treatment/Interventions: Aquatic therapy, Education/ Instruction, Gait training, Manual therapy, Neuromuscular re-education, Self care/ home management, Therapeutic exercises  PT Plan: Skilled PT  PT Frequency: 2 times per week  Duration: 4wks  Onset Date: 21  Certification Period Start Date: 25  Certification Period End Date: 25  Rehab Potential: Fair (previous PT for balance)  Plan of Care Agreement: Patient    Current Problem  Problem List Items Addressed This Visit           ICD-10-CM    Balance disorder R26.89       Subjective   States that he has not been very consistent with his HEP.     General  Reason for Referral: Balance disorder  Referred By: Saniya STRICKLAND  General Comment:   Visit #5  Precautions  Precautions  Precautions Comment: mod fall risk; mild dementia; essential tremor LUE; L knee pain HX    Pain  Pain Assessment: 0-10  0-10 (Numeric) Pain Score: 0 - No pain    Objective:     Treatments:  Therapeutic Exercise: x25'  SciFit x6'   Step ups 6\" step 2x10 Bilat leading (X)  Standing BHR 2x10 (X)  Standing BTR 2x10 (X)  Seated " "hip adduction 2x10 5\" hold (X)  Seated hip abduction 2x10 Mint Green tband (X)  Seated marches 2x10 Mint [place] tband (X)  LAQ 2x10 Bilat   STS x10   SLR x15 Bilat   Bridging x15   Wall march x10 ea (unsteady- used gait belt)    NMR: x15'   Gait belt used  Tandem stance 3x30\" Bilat, no UE assist   Tandem ambulation x2 laps, 1 UE assist (N)  Lateral step and return x10 ea LE, 1 UE assist (N)  Fwd step and return x10 ea LE, 1 UE assist (N)  FWD step and return with B fwd punches with each fwd step out (using colored stars for visual cues for foot placement and max A of B UE to punch fwd. (N)      POC:  Ongoing clinic PT to include:continue with BLE PRE; gait/balance activities    OP EDUCATION:  Access Code: [email or web address]  URL: [email or web address]  Date: 07/25/2025  Prepared by: Mare Vega    Exercises  - Seated Hip Abduction with Resistance  - 1 x daily - 7 x weekly - 3 sets - 10 reps  - Seated Hip Adduction Isometrics with Ball  - 1 x daily - 7 x weekly - 3 sets - 10 reps  - Sit to Stand  - 1 x daily - 7 x weekly - 3 sets - 10 reps  - Standing Tandem Balance with Counter Support  - 1 x daily - 7 x weekly - 3 reps - 30 hold  - Supine Active Straight Leg Raise  - 1 x daily - 7 x weekly - 3 sets - 10 reps    Access Code: [email or web address]  URL: [email or web address]  Date: 07/02/2025  Prepared by: Toni Alberto    Exercises  - Heel Raise   - Toe Raises with Counter Support   - Wall Squat with Leg Lifts     Goals:  Active       PT Problem       PT Goal 1       Start:  07/02/25    Expected End:  09/30/25       1. Independent HEP to allow for 50% reduction in max ADL C/C sx-gait confidence  2. Survey score improvement from 65% to 75% (ABC) 3-4wks  3. Strength increase to allow for improved ADL gait (from 4/5 to 4+/5 B gross knees/hips/ankles) 3-4wks         PT Goal 2       Start:  07/02/25    Expected End:  09/30/25       1. \"I want my  gait  to feel better\".             "

## 2025-08-01 ENCOUNTER — TREATMENT (OUTPATIENT)
Dept: PHYSICAL THERAPY | Facility: CLINIC | Age: 75
End: 2025-08-01
Payer: MEDICARE

## 2025-08-01 DIAGNOSIS — R26.89 BALANCE DISORDER: ICD-10-CM

## 2025-08-01 PROCEDURE — 97110 THERAPEUTIC EXERCISES: CPT | Mod: GP,CQ

## 2025-08-01 PROCEDURE — 97112 NEUROMUSCULAR REEDUCATION: CPT | Mod: GP,CQ

## 2025-08-01 ASSESSMENT — PAIN SCALES - GENERAL: PAINLEVEL_OUTOF10: 0 - NO PAIN

## 2025-08-01 ASSESSMENT — PAIN - FUNCTIONAL ASSESSMENT: PAIN_FUNCTIONAL_ASSESSMENT: 0-10

## 2025-08-01 NOTE — PROGRESS NOTES
Physical Therapy Treatment    Patient Name: Pato Christiansen  MRN: [id number]  : 1950  [place]  Today's Date: 2025  Time Calculation  Start Time: 1400  Stop Time: 1445  Time Calculation (min): 45 min  PT Therapeutic Procedures Time Entry  Therapeutic Exercise Time Entry: 28  Neuromuscular Re-Education Time Entry: 15         Assessment:   Patient identified by name & .  Treatment consisted of ex's and NMR activities. Patient required max verbal and tactile cues to coordinate all his ex's motions. Did well with cues. Used colored stars to assist with foot placement during NMR activities. Keeps LE motions small. Cues to increased step length/width with NMR. Patient denied pain post treatment.     Plan:  Continue with ex's and NMR for improving balance and decreasing fall risk. -MA   OP PT Plan  Treatment/Interventions: Aquatic therapy, Education/ Instruction, Gait training, Manual therapy, Neuromuscular re-education, Self care/ home management, Therapeutic exercises  PT Plan: Skilled PT  PT Frequency: 2 times per week  Duration: 4wks  Onset Date: 21  Certification Period Start Date: 25  Certification Period End Date: 25  Rehab Potential: Fair (previous PT for balance)  Plan of Care Agreement: Patient    Current Problem  Problem List Items Addressed This Visit           ICD-10-CM    Balance disorder R26.89       Subjective   Patient compliant with HEP off and on. States he and his wife walk the dog everyday. Denies falls since last visit. Denies fatigue or soreness after last session.   General  Reason for Referral: Balance disorder  Referred By: Saniya STRICKLAND  General Comment:   Visit #6  Precautions  Precautions  Precautions Comment: mod fall risk; mild dementia; essential tremor LUE; L knee pain HX    Pain  Pain Assessment: 0-10  0-10 (Numeric) Pain Score: 0 - No pain    Objective:     Treatments:  Therapeutic Exercise: x28'  SciFit x6', seat 20, arms 6  B calf stretch on  "slant board 1' x2 (N)  Step ups 6\" step 2x10 Bilat leading   Standing BHR 2x10  Standing BTR 2x10   Below not done today due to completing NMR   Seated hip adduction 2x10 5\" hold (X)  Seated hip abduction 2x10 Mint Green tband (X)  Seated marches 2x10 [place] tband (X)  LAQ 2x10 Bilat  STS x10   SLR x15 Bilat   Bridging x15   Wall march x10 ea (unsteady- used gait belt)    NMR: x15'   Gait belt used  Tandem stance 3x30\" Bilat, no UE assist   Tandem ambulation x2 laps, 1 UE assist  Lateral step and return x10 ea LE, 1 UE assist - using colored stars for visual cues foot placement  Alt lateral step and return x10 ea LE, 1 UE assist - \"       \"  Fwd step and return x10 ea LE, 1 UE assist - using colored stars for visual cues for foot placement  Alt fwd step and return x10 ea LE, 1 UE assist -  \"       \"  FWD step and return with B fwd punches with each fwd step out (using colored stars for visual cues for foot placement and max A of B UE to punch fwd. (X)      POC:  Ongoing clinic PT to include:continue with BLE PRE; gait/balance activities    OP EDUCATION:  Access Code: [email or web address]  URL: [email or web address]  Date: 07/25/2025  Prepared by: Mare Vega    Exercises  - Seated Hip Abduction with Resistance  - 1 x daily - 7 x weekly - 3 sets - 10 reps  - Seated Hip Adduction Isometrics with Ball  - 1 x daily - 7 x weekly - 3 sets - 10 reps  - Sit to Stand  - 1 x daily - 7 x weekly - 3 sets - 10 reps  - Standing Tandem Balance with Counter Support  - 1 x daily - 7 x weekly - 3 reps - 30 hold  - Supine Active Straight Leg Raise  - 1 x daily - 7 x weekly - 3 sets - 10 reps    Access Code: [email or web address]  URL: [email or web address]  Date: 07/02/2025  Prepared by: Toni Alberto    Exercises  - Heel Raise   - Toe Raises with Counter Support   - Wall Squat with Leg Lifts        Goals:  Active       PT Problem       PT Goal 1       Start:  07/02/25    Expected End:  09/30/25       1. " "Independent HEP to allow for 50% reduction in max ADL C/C sx-gait confidence  2. Survey score improvement from 65% to 75% (ABC) 3-4wks  3. Strength increase to allow for improved ADL gait (from 4/5 to 4+/5 B gross knees/hips/ankles) 3-4wks         PT Goal 2       Start:  07/02/25    Expected End:  09/30/25       1. \"I want my  gait  to feel better\".             "

## 2025-08-04 ENCOUNTER — TREATMENT (OUTPATIENT)
Dept: PHYSICAL THERAPY | Facility: CLINIC | Age: 75
End: 2025-08-04
Payer: MEDICARE

## 2025-08-04 DIAGNOSIS — R26.89 BALANCE DISORDER: ICD-10-CM

## 2025-08-04 PROCEDURE — 97112 NEUROMUSCULAR REEDUCATION: CPT | Mod: GP,CQ

## 2025-08-04 PROCEDURE — 97110 THERAPEUTIC EXERCISES: CPT | Mod: GP,CQ

## 2025-08-04 ASSESSMENT — PAIN SCALES - GENERAL: PAINLEVEL_OUTOF10: 0 - NO PAIN

## 2025-08-04 ASSESSMENT — PAIN - FUNCTIONAL ASSESSMENT: PAIN_FUNCTIONAL_ASSESSMENT: 0-10

## 2025-08-04 NOTE — PROGRESS NOTES
"Physical Therapy Treatment    Patient Name: Pato Christiansen  MRN: [id number]  Today's Date: 8/4/2025  Time Calculation  Start Time: 1400  Stop Time: 1440  Time Calculation (min): 40 min  PT Therapeutic Procedures Time Entry  Therapeutic Exercise Time Entry: 23  Neuromuscular Re-Education Time Entry: 15       Assessment:   Patient able to tolerate session with mild challenges. Continues to require tactile and verbal cues for proper form with tandem stance and standing NMR. Patient demo's improvements in symptoms pre and post session.     Plan:  Next session is recheck with PT. -AB.     OP PT Plan  Treatment/Interventions: Aquatic therapy, Education/ Instruction, Gait training, Manual therapy, Neuromuscular re-education, Self care/ home management, Therapeutic exercises  PT Plan: Skilled PT  PT Frequency: 2 times per week  Duration: 4wks  Onset Date: 07/02/21  Certification Period Start Date: 07/02/25  Certification Period End Date: 09/30/25  Rehab Potential: Fair (previous PT for balance)  Plan of Care Agreement: Patient    Current Problem  Problem List Items Addressed This Visit           ICD-10-CM    Balance disorder R26.89       Subjective   General  Reason for Referral: Balance disorder  Referred By: Saniya STRICKLAND  General Comment: 7/9  Visit: 7  HEP: Yes  Patient states that he is not having pain currently.     Precautions  Precautions  Precautions Comment: mod fall risk; mild dementia; essential tremor LUE; L knee pain HX    Pain  Pain Assessment: 0-10  0-10 (Numeric) Pain Score: 0 - No pain    Objective     Treatments:  Therapeutic Exercise: 23 minutes, 2 units  SciFit x6', seat 20, arms 6  B calf stretch on slant board 1' x2   Step ups 6\" step 2x10 Bilat leading   Standing BHR 2x10  Standing BTR 2x10   STS x10     Below not done today due to completing NMR   Seated hip adduction 2x10 5\" hold (X)  Seated hip abduction 2x10 [place] tband (X)  Seated marches 2x10 [place] tband (X)  LAQ 2x10 Bilat  SLR x15 Bilat " "  Bridging x15   Wall march x10 ea (unsteady- used gait belt)    NMR: x15'   Gait belt used  Tandem stance 3x30\" Bilat, no UE assist   Tandem ambulation x2 laps, 1 UE assist  Lateral step and return x10 ea LE, 1 UE assist - using colored stars for visual cues foot placement  Alt lateral step and return x10 ea LE, 1 UE assist - \"       \"  Fwd step and return x10 ea LE, 1 UE assist - using colored stars for visual cues for foot placement  Alt fwd step and return x10 ea LE, 1 UE assist -  \"       \"  FWD step and return with B fwd punches with each fwd step out (using colored stars for visual cues for foot placement and max A of B UE to punch fwd. (X)      POC:  Ongoing clinic PT to include:continue with BLE PRE; gait/balance activities    OP EDUCATION:  Access Code: [email or web address]  URL: [email or web address]  Date: 07/25/2025  Prepared by: Mare Vega    Exercises  - Seated Hip Abduction with Resistance  - 1 x daily - 7 x weekly - 3 sets - 10 reps  - Seated Hip Adduction Isometrics with Ball  - 1 x daily - 7 x weekly - 3 sets - 10 reps  - Sit to Stand  - 1 x daily - 7 x weekly - 3 sets - 10 reps  - Standing Tandem Balance with Counter Support  - 1 x daily - 7 x weekly - 3 reps - 30 hold  - Supine Active Straight Leg Raise  - 1 x daily - 7 x weekly - 3 sets - 10 reps    Access Code: [email or web address]  URL: [email or web address]  Date: 07/02/2025  Prepared by: Toni Alberto    Exercises  - Heel Raise   - Toe Raises with Counter Support   - Wall Squat with Leg Lifts       Goals:  Active       PT Problem       PT Goal 1       Start:  07/02/25    Expected End:  09/30/25       1. Independent HEP to allow for 50% reduction in max ADL C/C sx-gait confidence  2. Survey score improvement from 65% to 75% (ABC) 3-4wks  3. Strength increase to allow for improved ADL gait (from 4/5 to 4+/5 B gross knees/hips/ankles) 3-4wks         PT Goal 2       Start:  07/02/25    Expected End:  09/30/25       1. " "\"I want my  gait  to feel better\".             "

## 2025-08-08 ENCOUNTER — TREATMENT (OUTPATIENT)
Dept: PHYSICAL THERAPY | Facility: CLINIC | Age: 75
End: 2025-08-08
Payer: MEDICARE

## 2025-08-08 DIAGNOSIS — R26.89 BALANCE DISORDER: ICD-10-CM

## 2025-08-08 PROCEDURE — 97110 THERAPEUTIC EXERCISES: CPT | Mod: GP

## 2025-08-08 ASSESSMENT — PAIN - FUNCTIONAL ASSESSMENT: PAIN_FUNCTIONAL_ASSESSMENT: 0-10

## 2025-08-08 ASSESSMENT — PAIN SCALES - GENERAL: PAINLEVEL_OUTOF10: 0 - NO PAIN

## 2025-08-08 NOTE — LETTER
August 8, 2025    MODESTA Antonio  [address], [address]  [facility] [place] [address]    Patient: Pato Christiansen   YOB: 1950   Date of Visit: 8/8/2025       Dear MODESTA Antonio  [address], [address]  [place] [address]    The attached plan of care is being sent to you because your patient’s medical reimbursement requires that you certify the plan of care. Your signature is required to allow uninterrupted insurance coverage.      You may indicate your approval by signing below and faxing this form back to us at Dept Fax: [phone].    Please call Dept: [phone] with any questions or concerns.    Thank you for this referral,        Alexandro Alberto, PT  [address] [address]    Payer: Payor: [employer] / Plan: [employer] M[employer]ARE / Product Type: *No Product type* /                                                                         Date:     Dear Alexandro Alberto PT,     Re: Mr. Pato Christiansen, MRN:[id number]    I certify that I have reviewed the attached plan of care and it is medically necessary for Mr. Pato Christiansen (1950) who is under my care.          ______________________________________                    _________________  Provider name and credentials                                           Date and time                                                                                           Plan of Care 7/2/25   Effective from: 7/2/2025  Effective to: 9/30/2025    Plan ID: [id number]                Participants as of Finalize on 7/2/2025      Name Type Comments Contact Info    MODESTA Antonio Referring Provider  [phone]    Alexandro Alberto PT Physical Therapist  [phone]           Last Plan Note       Author: Alexandro Alberto PT Status: Signed Last edited: 7/2/2025 11:30 AM         Physical Therapy Evaluation and Treatment      Patient Name:  Pato Christiansen  MRN: [id number]  Today's Date: 2025  : 1950  Tiffany Kothari  Time Calculation  Start Time: 1132  Stop Time: 1205  Time Calculation (min): 33 min    PT Evaluation Time Entry  PT Evaluation (Low) Time Entry: 23   PT Therapeutic Procedures Time Entry  Therapeutic Exercise Time Entry: 10                         Assessment:  Rehab Prognosis: Fair (previous PT for balance)  Barriers to Participation:  (none)    Plan:  PT Plan: Skilled PT  PT Frequency: 2 times per week  Duration: 4wks  Onset Date: 21  Certification Period Start Date: 25  Certification Period End Date: 25  Rehab Potential: Fair (previous PT for balance)  Plan of Care Agreement: Patient    Current Problem:   1. Impaired mobility and ADLs  Referral to Physical Therapy      2. Balance disorder  Referral to Physical Therapy    Follow Up In Physical Therapy          Subjective    General:  General  Reason for Referral: Balance disorder  Referred By: Saniya STRICKLAND  General Comment:   C/C sx*/Max sx level*:no pain; here for balance (per the patient dizziness is not an issue)  OTONIEL/DOI/Work*?:  chronic sx x 4yrs with previous rehab  ADL makes worse*?:walking on uneven ground  ADL makes better?:------  PLOF:Unlimited job/home tasks performed-NO: ADL gait:   (see goals)  Testing*:no recent testing in MR    Physical Findings*: Limited:                                                Strength (gross BLE )                                                 The patient/wife admits to not being consistent with previous rehab-recommended HEP for balance/gait.  POC:  Ongoing clinic PT to include:continue with BLE PRE; gait/balance activities    Other: (copay*?-no  ) (fall risk*?- mod  ) (ins visit limit*?-no   )     Precautions:  Precautions  STEADI Fall Risk Score (The score of 4 or more indicates an increased risk of falling): 3  Precautions Comment: mod fall risk; mild dementia; essential tremor LUE; L knee pain  "HX    Pain:  Pain Assessment  Pain Assessment: 0-10    Prior Level of Function:  Prior Function Per Pt/Caregiver Report  Vocational: Retired    Objective     Outcome Measures:  Other Measures  Activities - Specific Balance Confidence Scale: 1040     Treatments:  BHR x10  BTR x10  Wall march x5 ea  POC:  Ongoing clinic PT to include:continue with BLE PRE; gait/balance activities  OP EDUCATION:  Access Code: [email or web address]  URL: [email or web address]  Date: 07/02/2025  Prepared by: Toni Alberto    Exercises  - Heel Raise   - Toe Raises with Counter Support   - Wall Squat with Leg Lifts     Goals:  Active       PT Problem       PT Goal 1       Start:  07/02/25    Expected End:  09/30/25       1. Independent HEP to allow for 50% reduction in max ADL C/C sx-gait confidence  2. Survey score improvement from 65% to 75% (ABC) 3-4wks  3. Strength increase to allow for improved ADL gait (from 4/5 to 4+/5 B gross knees/hips/ankles) 3-4wks         PT Goal 2       Start:  07/02/25    Expected End:  09/30/25       1. \"I want my  gait  to feel better\".              HIP        Hip Observation  Observation Comment: trunk FB; wide CAROLYN; B toe-out; mildly uneven majo with gait into clinic           Lower Extremity Strength: WFL unless documented  MMT 5/5 max  RIGHT LEFT   Hip Flexion    4 /5    4 /5   Hip Extension    5 /5    5 /5   Hip Abduction    4+ /5     4+/5        Knee Extension    4 /5    4 /5   Knee Flexion    4 /5     4/5   Ankle DF    4 /5    4 /5   Ankle PF    4 /5     4/5   Ankle INV     4/5    4 /5   Ankle EV    4 /5    4 /5          Current Participants as of 8/8/2025      Name Type Comments Contact Info    Tiffany Kothari, APRN-CNP Referring Provider  [phone]    Signature pending    Alexandro Alberto, PT Physical Therapist  [phone]            "

## 2025-08-08 NOTE — PROGRESS NOTES
"Physical Therapy Treatment    Patient Name: Pato Christiansen  MRN: [id number]  : 1950  Today's Date: 2025  Tiffany Kothari  Time Calculation  Start Time: 1334  Stop Time: 1357  Time Calculation (min): 23 min      PT Therapeutic Procedures Time Entry  Therapeutic Exercise Time Entry: 23                         General:  General  Reason for Referral: Balance disorder  Referred By: Saniya STRICKLAND  General Comment:   Visit #8    Current Problem  Problem List Items Addressed This Visit           ICD-10-CM    Balance disorder R26.89         Assessment:Patient identity confirmed today with name/. ;  ( 0 ) falls since last clinic visit; see goals;       Plan:  This patient agrees to discharge to ongoing HEP and home management with ( good ) progress achieved.       Subjective: I have   0/10 pain right now.         Precautions  Precautions  Precautions Comment: mod fall risk; mild dementia; essential tremor LUE; L knee pain HX      Pain  Pain Assessment: 0-10  0-10 (Numeric) Pain Score: 0 - No pain    Objective  See goals; at least 40% improvements in ADL gait; strength goal has been MET  Manual:__0___min    There ex:__23___min  Goals reviewed, surveyed and/or updated  Reviewed and/or modified ongoing HEP today.  NOT TODAY  SciFit x6', seat 20, arms 6  B calf stretch on slant board 1' x2   Step ups 6\" step 2x10 Bilat leading   Standing BHR 2x10  Standing BTR 2x10   STS x10    Seated hip adduction 2x10 5\" hold (X)  Seated hip abduction 2x10 Mint Green tband (X)  Seated marches 2x10 Mint Green tband (X)  LAQ 2x10 Bilat  SLR x15 Bilat   Bridging x15   Wall march x10 ea (unsteady- used gait belt)     NMR: x15'   Gait belt used  Tandem stance 3x30\" Bilat, no UE assist   Tandem ambulation x2 laps, 1 UE assist  Lateral step and return x10 ea LE, 1 UE assist - using colored stars for visual cues foot placement  Alt lateral step and return x10 ea LE, 1 UE assist - \"                                                     " "                      \"  Fwd step and return x10 ea LE, 1 UE assist - using colored stars for visual cues for foot placement  Alt fwd step and return x10 ea LE, 1 UE assist -  \"                                                                              \"  FWD step and return with B fwd punches with each fwd step out (using colored stars for visual cues for foot placement and max A of B UE to punch fwd. (X)        POC:  Ongoing clinic PT to include:continue with BLE PRE; gait/balance activities     OP EDUCATION:  Access Code: [email or web address]  URL: [email or web address]  Date: 07/25/2025  Prepared by: Mare Vega     Exercises  - Seated Hip Abduction with Resistance  - 1 x daily - 7 x weekly - 3 sets - 10 reps  - Seated Hip Adduction Isometrics with Ball  - 1 x daily - 7 x weekly - 3 sets - 10 reps  - Sit to Stand  - 1 x daily - 7 x weekly - 3 sets - 10 reps  - Standing Tandem Balance with Counter Support  - 1 x daily - 7 x weekly - 3 reps - 30 hold  - Supine Active Straight Leg Raise  - 1 x daily - 7 x weekly - 3 sets - 10 reps     Access Code: [email or web address]  URL: [email or web address]  Date: 07/02/2025  Prepared by: Toni Alberto     Exercises  - Heel Raise   - Toe Raises with Counter Support   - Wall Squat with Leg Lifts        OP EDUCATION:  Cane use on uneven ground    Goals:  Active       PT Problem       PT Goal 1       Start:  07/02/25    Expected End:  09/30/25       1. Independent HEP to allow for 50% reduction in max ADL C/C sx-gait confidence 40% better; 8/9/25; PARTIALLY MET   2. Survey score improvement from 65% to 75% (ABC) 3-4wks 76% as of 8/9/25; MET  3. Strength increase to allow for improved ADL gait (from 4/5 to 4+/5 B gross knees/hips/ankles) 3-4wks improved ADL gait stamina and balance on uneven surfaces; 5/5 gross BLE hip/knee/ankle strength; 8/9/25; MET         PT Goal 2       Start:  07/02/25    Expected End:  09/30/25       1. \"I want my  gait  to feel " "better\". \"The patient can walk farther and faster\".: 8/9/25; MET            " Iqra, Yani(Attending)

## 2025-08-08 NOTE — Clinical Note
August 8, 2025    MODESTA Antonio  [address]  [facility] [address]    Patient: Pato Christiansen   YOB: 1950   Date of Visit: 8/8/2025       Dear MODESTA Antonio  [address] [facility] [address]  [place] [address]    The attached plan of care is being sent to you because your patient’s medical reimbursement requires that you certify the plan of care. Your signature is required to allow uninterrupted insurance coverage.      You may indicate your approval by signing below and faxing this form back to us at Dept Fax: [phone].    Please call Dept: [phone] with any questions or concerns.    Thank you for this referral,        Alexandro Alberto, PT  [address] [address]    Payer: Payor: [employer] / Plan: NATH[employer]EL M[employer]OZ MEDICARE / Product Type: *No Product type* /                                                                         Date:     Dear Alexandro Alberto PT,     Re: Mr. Pato Christiansen, MRN:[id number]    I certify that I have reviewed the attached plan of care and it is medically necessary for Mr. Pato Christiansen (1950) who is under my care.          ______________________________________                    _________________  Provider name and credentials                                           Date and time                                                                                           Plan of Care 8/8/25   Effective from: 8/8/2025  Effective to: 8/8/2025    Plan ID: [id number]            Participants as of Finalize on 8/8/2025    Name Type Comments Contact Info    MODESTA Antonio Referring Provider  [phone]    Alexandro Alberto PT Physical Therapist  [phone]       Last Plan Note     Author: Alexandro Alberto PT Status: Signed Last edited: 8/8/2025  1:30 PM       Physical Therapy Treatment    Patient Name: Pato Christiansen  MRN:  "[id number]  : 1950  Today's Date: 2025  Tiffany Kothari  Time Calculation  Start Time: 1334  Stop Time: 1357  Time Calculation (min): 23 min      PT Therapeutic Procedures Time Entry  Therapeutic Exercise Time Entry: 23                         General:  General  Reason for Referral: Balance disorder  Referred By: Saniya STRICKLAND  General Comment:   Visit #8    Current Problem  Problem List Items Addressed This Visit           ICD-10-CM    Balance disorder R26.89         Assessment:Patient identity confirmed today with name/. ;  ( 0 ) falls since last clinic visit; see goals;       Plan:  This patient agrees to discharge to ongoing HEP and home management with ( good ) progress achieved.       Subjective: I have   0/10 pain right now.         Precautions  Precautions  Precautions Comment: mod fall risk; mild dementia; essential tremor LUE; L knee pain HX      Pain  Pain Assessment: 0-10  0-10 (Numeric) Pain Score: 0 - No pain    Objective  See goals; at least 40% improvements in ADL gait; strength goal has been MET  Manual:__0___min    There ex:__23___min  Goals reviewed, surveyed and/or updated  Reviewed and/or modified ongoing HEP today.  NOT TODAY  SciFit x6', seat 20, arms 6  B calf stretch on slant board 1' x2   Step ups 6\" step 2x10 Bilat leading   Standing BHR 2x10  Standing BTR 2x10   STS x10    Seated hip adduction 2x10 5\" hold (X)  Seated hip abduction 2x10 Mint Green tband (X)  Seated marches 2x10 Mint Green tband (X)  LAQ 2x10 Bilat  SLR x15 Bilat   Bridging x15   Wall march x10 ea (unsteady- used gait belt)     NMR: x15'   Gait belt used  Tandem stance 3x30\" Bilat, no UE assist   Tandem ambulation x2 laps, 1 UE assist  Lateral step and return x10 ea LE, 1 UE assist - using colored stars for visual cues foot placement  Alt lateral step and return x10 ea LE, 1 UE assist - \"                                                                           \"  Fwd step and return x10 ea LE, 1 UE " "assist - using colored stars for visual cues for foot placement  Alt fwd step and return x10 ea LE, 1 UE assist -  \"                                                                              \"  FWD step and return with B fwd punches with each fwd step out (using colored stars for visual cues for foot placement and max A of B UE to punch fwd. (X)        POC:  Ongoing clinic PT to include:continue with BLE PRE; gait/balance activities     OP EDUCATION:  Access Code: [email or web address]  URL: [email or web address]  Date: 07/25/2025  Prepared by: Mare Vega     Exercises  - Seated Hip Abduction with Resistance  - 1 x daily - 7 x weekly - 3 sets - 10 reps  - Seated Hip Adduction Isometrics with Ball  - 1 x daily - 7 x weekly - 3 sets - 10 reps  - Sit to Stand  - 1 x daily - 7 x weekly - 3 sets - 10 reps  - Standing Tandem Balance with Counter Support  - 1 x daily - 7 x weekly - 3 reps - 30 hold  - Supine Active Straight Leg Raise  - 1 x daily - 7 x weekly - 3 sets - 10 reps     Access Code: [email or web address]  URL: [email or web address]  Date: 07/02/2025  Prepared by: Toni Alberto     Exercises  - Heel Raise   - Toe Raises with Counter Support   - Wall Squat with Leg Lifts        OP EDUCATION:  Cane use on uneven ground    Goals:  Active       PT Problem       PT Goal 1       Start:  07/02/25    Expected End:  09/30/25       1. Independent HEP to allow for 50% reduction in max ADL C/C sx-gait confidence 40% better; 8/9/25; PARTIALLY MET   2. Survey score improvement from 65% to 75% (ABC) 3-4wks 76% as of 8/9/25; MET  3. Strength increase to allow for improved ADL gait (from 4/5 to 4+/5 B gross knees/hips/ankles) 3-4wks improved ADL gait stamina and balance on uneven surfaces; 5/5 gross BLE hip/knee/ankle strength; 8/9/25; MET         PT Goal 2       Start:  07/02/25    Expected End:  09/30/25       1. \"I want my  gait  to feel better\". \"The patient can walk farther and faster\".: 8/9/25; " MET                   Current Participants as of 8/8/2025    Name Type Comments Contact Info    Tiffany Kothari, JELANI-CNP Referring Provider  [phone]    Signature pending    Alexandro Alberto PT Physical Therapist  [phone]

## 2025-08-19 ENCOUNTER — TELEPHONE (OUTPATIENT)
Dept: PRIMARY CARE | Facility: CLINIC | Age: 75
End: 2025-08-19
Payer: MEDICARE

## 2025-10-20 ENCOUNTER — APPOINTMENT (OUTPATIENT)
Dept: NEPHROLOGY | Facility: CLINIC | Age: 75
End: 2025-10-20
Payer: MEDICARE

## 2026-01-12 ENCOUNTER — APPOINTMENT (OUTPATIENT)
Dept: PRIMARY CARE | Facility: CLINIC | Age: 76
End: 2026-01-12
Payer: MEDICARE